# Patient Record
Sex: FEMALE | Race: WHITE | NOT HISPANIC OR LATINO | Employment: OTHER | URBAN - METROPOLITAN AREA
[De-identification: names, ages, dates, MRNs, and addresses within clinical notes are randomized per-mention and may not be internally consistent; named-entity substitution may affect disease eponyms.]

---

## 2021-06-01 ENCOUNTER — RECORDS - HEALTHEAST (OUTPATIENT)
Dept: ADMINISTRATIVE | Facility: CLINIC | Age: 81
End: 2021-06-01

## 2023-09-18 ENCOUNTER — HOSPITAL ENCOUNTER (EMERGENCY)
Facility: CLINIC | Age: 83
Discharge: HOME | End: 2023-09-19
Attending: EMERGENCY MEDICINE | Admitting: EMERGENCY MEDICINE
Payer: MEDICARE

## 2023-09-18 DIAGNOSIS — U07.1 INFECTION DUE TO 2019 NOVEL CORONAVIRUS: ICD-10-CM

## 2023-09-18 DIAGNOSIS — U07.1 COVID-19 VIRUS INFECTION: ICD-10-CM

## 2023-09-18 DIAGNOSIS — R29.6 FALLS FREQUENTLY: ICD-10-CM

## 2023-09-18 PROCEDURE — 80048 BASIC METABOLIC PNL TOTAL CA: CPT | Performed by: EMERGENCY MEDICINE

## 2023-09-18 PROCEDURE — 36415 COLL VENOUS BLD VENIPUNCTURE: CPT | Performed by: EMERGENCY MEDICINE

## 2023-09-18 PROCEDURE — 99284 EMERGENCY DEPT VISIT MOD MDM: CPT | Performed by: EMERGENCY MEDICINE

## 2023-09-18 PROCEDURE — 85004 AUTOMATED DIFF WBC COUNT: CPT | Performed by: EMERGENCY MEDICINE

## 2023-09-18 PROCEDURE — 99284 EMERGENCY DEPT VISIT MOD MDM: CPT | Mod: 25 | Performed by: EMERGENCY MEDICINE

## 2023-09-19 ENCOUNTER — APPOINTMENT (OUTPATIENT)
Dept: CT IMAGING | Facility: CLINIC | Age: 83
End: 2023-09-19
Attending: EMERGENCY MEDICINE
Payer: MEDICARE

## 2023-09-19 VITALS
WEIGHT: 106 LBS | HEIGHT: 62 IN | BODY MASS INDEX: 19.51 KG/M2 | HEART RATE: 107 BPM | SYSTOLIC BLOOD PRESSURE: 118 MMHG | TEMPERATURE: 99.4 F | DIASTOLIC BLOOD PRESSURE: 72 MMHG | OXYGEN SATURATION: 98 %

## 2023-09-19 PROBLEM — G62.0 DRUG-INDUCED POLYNEUROPATHY (H): Status: ACTIVE | Noted: 2017-12-12

## 2023-09-19 PROBLEM — I49.3 PVC'S (PREMATURE VENTRICULAR CONTRACTIONS): Status: ACTIVE | Noted: 2017-11-20

## 2023-09-19 PROBLEM — D70.2 DRUG-INDUCED NEUTROPENIA (H): Status: ACTIVE | Noted: 2017-01-10

## 2023-09-19 PROBLEM — R29.6 REPEATED FALLS: Status: ACTIVE | Noted: 2023-06-21

## 2023-09-19 LAB
ANION GAP SERPL CALCULATED.3IONS-SCNC: 11 MMOL/L (ref 7–15)
BASOPHILS # BLD AUTO: 0 10E3/UL (ref 0–0.2)
BASOPHILS NFR BLD AUTO: 0 %
BUN SERPL-MCNC: 12.8 MG/DL (ref 8–23)
CALCIUM SERPL-MCNC: 9.4 MG/DL (ref 8.8–10.2)
CHLORIDE SERPL-SCNC: 100 MMOL/L (ref 98–107)
CREAT SERPL-MCNC: 0.78 MG/DL (ref 0.51–0.95)
DEPRECATED HCO3 PLAS-SCNC: 27 MMOL/L (ref 22–29)
EGFRCR SERPLBLD CKD-EPI 2021: 74 ML/MIN/1.73M2
EOSINOPHIL # BLD AUTO: 0 10E3/UL (ref 0–0.7)
EOSINOPHIL NFR BLD AUTO: 1 %
ERYTHROCYTE [DISTWIDTH] IN BLOOD BY AUTOMATED COUNT: 14.7 % (ref 10–15)
GLUCOSE SERPL-MCNC: 121 MG/DL (ref 70–99)
HCT VFR BLD AUTO: 36.1 % (ref 35–47)
HGB BLD-MCNC: 11.8 G/DL (ref 11.7–15.7)
HOLD SPECIMEN: NORMAL
HOLD SPECIMEN: NORMAL
IMM GRANULOCYTES # BLD: 0.1 10E3/UL
IMM GRANULOCYTES NFR BLD: 1 %
LYMPHOCYTES # BLD AUTO: 0.6 10E3/UL (ref 0.8–5.3)
LYMPHOCYTES NFR BLD AUTO: 8 %
MCH RBC QN AUTO: 31 PG (ref 26.5–33)
MCHC RBC AUTO-ENTMCNC: 32.7 G/DL (ref 31.5–36.5)
MCV RBC AUTO: 95 FL (ref 78–100)
MONOCYTES # BLD AUTO: 0.9 10E3/UL (ref 0–1.3)
MONOCYTES NFR BLD AUTO: 13 %
NEUTROPHILS # BLD AUTO: 5.8 10E3/UL (ref 1.6–8.3)
NEUTROPHILS NFR BLD AUTO: 77 %
NRBC # BLD AUTO: 0 10E3/UL
NRBC BLD AUTO-RTO: 0 /100
PLATELET # BLD AUTO: 162 10E3/UL (ref 150–450)
POTASSIUM SERPL-SCNC: 3.4 MMOL/L (ref 3.4–5.3)
RBC # BLD AUTO: 3.81 10E6/UL (ref 3.8–5.2)
SODIUM SERPL-SCNC: 138 MMOL/L (ref 136–145)
WBC # BLD AUTO: 7.4 10E3/UL (ref 4–11)

## 2023-09-19 PROCEDURE — 70450 CT HEAD/BRAIN W/O DYE: CPT | Mod: MA

## 2023-09-19 ASSESSMENT — ACTIVITIES OF DAILY LIVING (ADL): ADLS_ACUITY_SCORE: 35

## 2023-09-19 NOTE — ED PROVIDER NOTES
History     Chief Complaint   Patient presents with    Fall    Suspected Covid     HPI  Jacquelin Sampson is a 83 year old female with history notable for multiple myeloma, drug-induced polyneuropathy, mixed hyperlipidemia, PVCs, repeated falls, who presents with 3 days nasal congestion, rhinorrhea, cough, with positive home covid test today. Fell three times today. Eating and drinking okay. No headache or vomiting. Has chronic back  pian. No neck pain. No abdominal pain no chest pain. No urinary symptoms. Needed help getting up on last two falls.  had to call family to help.     Patient denies any complaints, is here at urging of family.     The patient's PMHx, Surgical Hx, Allergies, and Medications were all reviewed with the patient.    Revlimid, 81 asa, simvastatin, dexamethasone, metoprolol    Allergies:  No Known Allergies    Problem List:    Patient Active Problem List    Diagnosis Date Noted    Repeated falls 06/21/2023     Priority: Medium    Drug-induced polyneuropathy (H) 12/12/2017     Priority: Medium    PVC's (premature ventricular contractions) 11/20/2017     Priority: Medium     Formatting of this note might be different from the original. Maintained on beta blocker therapy with Metoprolol XL, and this has suppressed the PVC's quite well.      Drug-induced neutropenia (H) 01/10/2017     Priority: Medium    Multiple myeloma (H) 05/13/2013     Priority: Medium     Formatting of this note is different from the original. CT 5/2013 suspicious for multiple myeloma. Bone marrow biopsy positive 6/2013 Has chronic low back, has been on Tylenol #3 chronically but only uses at bedtime/night as needed for severe pain.On Cyclone clinical trial. Had one year of chemotherapy on a trial drug at [facility] starting 6/2013, and had a good response to this drug, and follows every 3 months at [place] and has been in remission since completing this course of treatment. Mild progression of metastatic myeloma (skeletal  mets again seen on bone survey done 8/22/2016) noted at Hematology follow up with Dr. Tsai at [facility] 8/22/2016, and decision made to resume treatment again (with the exact agent to be determined based on insurance coverage issues). On Lenalidomide subsequently. At follow up with Dr. Tsai 3/21/2019: ASSESSMENT / PLAN #1 Polyneuropathy Due To Drug (HCC) #2 Pain Back Thoracic #3 Multiple Myeloma Not Having Achieved Remission (HCC) She is doing well. She likely continues to be in remission. Her SPEP and free light chain studies are pending and will not be back until tomorrow. I will call her with the results. Her total protein today is lower than from 3 months ago and therefore it is unlikely that there is progressive disease. My plan is to continue lenalidomide at 10 mg p.o. days 1 through 21 every month and dexamethasone 20 mg PO weekly. I gave her a for refill for hydromorphone prescription which she takes for her chronic low back pain. I plan to see her back for follow-up in 3 months.      Mixed hyperlipidemia 03/08/2009     Priority: Medium    Displacement of cervical intervertebral disc without myelopathy 03/07/2007     Priority: Medium     Formatting of this note might be different from the original. s/p laminectomy C7          Past Medical History:    No past medical history on file.    Past Surgical History:    No past surgical history on file.    Family History:    No family history on file.    Social History:  Marital Status:   [2]        Medications:    nirmatrelvir and ritonavir (PAXLOVID) 300 mg/100 mg therapy pack  aspirin 325 MG tablet  BACLOFEN PO  Cholecalciferol (VITAMIN D3 PO)  ferrous sulfate 325 (65 FE) MG tablet  HYDROmorphone (DILAUDID) 2 MG tablet  HYDROmorphone HCl (DILAUDID PO)  Nadolol (CORGARD PO)  SIMVASTATIN PO          Review of Systems  Pertinent positives and negatives mentioned in HPI    Physical Exam   BP: 137/79  Pulse: 82  Temp: 99.4  F (37.4  C)  Height: 157.5 cm (5'  "2\")  Weight: 48.1 kg (106 lb)  SpO2: 99 %    Physical Exam  GEN: Awake, alert, and cooperative.  Appears mildly distressed but nontoxic  HENT: No hemotympanum or otorrhea.  No retroauricular ecchymosis.  No signs of scalp abrasion or laceration.  No signs of facial tenderness or epistaxis.    EYES: EOM intact. Conjunctiva clear. No discharge.  No RAPD no nystagmus  NECK: Symmetric, freely mobile.  No posterior tenderness  CV : Regular rate and rhythm.  PULM: Normal effort. No wheezes, rales, or rhonchi bilaterally.  ABD: Soft, non-tender, non-distended. No rebound or guarding.   NEURO: Normal speech. Following commands. CN II-XII grossly intact. Answering questions and interacting appropriately.   EXT: Tenderness to palpation of ankles knees and hips, wrists, elbows or shoulders.  INT: Warm. No diaphoresis. Normal color.        ED Course        Procedures                 Critical Care time:  none               Results for orders placed or performed during the hospital encounter of 09/18/23 (from the past 24 hour(s))   [facility] Draw    Narrative    The following orders were created for panel order [facility] Draw.  Procedure                               Abnormality         Status                     ---------                               -----------         ------                     Extra Green Top (Lithium...[[id number]]                      Final result               Extra Purple Top Tube[[id number]]                            Final result                 Please view results for these tests on the individual orders.   Extra Green Top (Lithium Heparin) Tube   Result Value Ref Range    Hold Specimen JIC    Extra Purple Top Tube   Result Value Ref Range    Hold Specimen JIC    CBC with platelets, differential    Narrative    The following orders were created for panel order CBC with platelets, differential.  Procedure                               Abnormality         Status                     ---------                   "             -----------         ------                     CBC with platelets and d...[[id number]]  Abnormal            Final result                 Please view results for these tests on the individual orders.   Basic metabolic panel   Result Value Ref Range    Sodium 138 136 - 145 mmol/L    Potassium 3.4 3.4 - 5.3 mmol/L    Chloride 100 98 - 107 mmol/L    Carbon Dioxide (CO2) 27 22 - 29 mmol/L    Anion Gap 11 7 - 15 mmol/L    Urea Nitrogen 12.8 8.0 - 23.0 mg/dL    Creatinine 0.78 0.51 - 0.95 mg/dL    Calcium 9.4 8.8 - 10.2 mg/dL    Glucose 121 (H) 70 - 99 mg/dL    GFR Estimate 74 >60 mL/min/1.73m2   CBC with platelets and differential   Result Value Ref Range    WBC Count 7.4 4.0 - 11.0 10e3/uL    RBC Count 3.81 3.80 - 5.20 10e6/uL    Hemoglobin 11.8 11.7 - 15.7 g/dL    Hematocrit 36.1 35.0 - 47.0 %    MCV 95 78 - 100 fL    MCH 31.0 26.5 - 33.0 pg    MCHC 32.7 31.5 - 36.5 g/dL    RDW 14.7 10.0 - 15.0 %    Platelet Count 162 150 - 450 10e3/uL    % Neutrophils 77 %    % Lymphocytes 8 %    % Monocytes 13 %    % Eosinophils 1 %    % Basophils 0 %    % Immature Granulocytes 1 %    NRBCs per 100 WBC 0 <1 /100    Absolute Neutrophils 5.8 1.6 - 8.3 10e3/uL    Absolute Lymphocytes 0.6 (L) 0.8 - 5.3 10e3/uL    Absolute Monocytes 0.9 0.0 - 1.3 10e3/uL    Absolute Eosinophils 0.0 0.0 - 0.7 10e3/uL    Absolute Basophils 0.0 0.0 - 0.2 10e3/uL    Absolute Immature Granulocytes 0.1 <=0.4 10e3/uL    Absolute NRBCs 0.0 10e3/uL   Head CT w/o contrast    Narrative    EXAM: CT HEAD W/O CONTRAST  LOCATION: [facility]  DATE: 9/19/2023    INDICATION: frequent falls  COMPARISON: None.  TECHNIQUE: Routine CT Head without IV contrast. Multiplanar reformats. Dose reduction techniques were used.    FINDINGS:  INTRACRANIAL CONTENTS: No intracranial hemorrhage, extraaxial collection, or mass effect.  No CT evidence of acute infarct. Moderate burden presumed chronic small vessel ischemia. Normal ventricles and sulci.      VISUALIZED ORBITS/SINUSES/MASTOIDS: No intraorbital abnormality. No paranasal sinus mucosal disease. No middle ear or mastoid effusion.    BONES/SOFT TISSUES: No acute abnormality.      Impression    IMPRESSION:  1.  No acute intracranial abnormality.    2.  Moderate burden presumed chronic small vessel ischemia.       Medications - No data to display    Assessments & Plan (with Medical Decision Making)   83 year old female with past medical history 83-year-old female with history of multiple myeloma, frequent falls, with 3 days of cold symptoms and positive home COVID testing today.  Denies any new pain.  She has chronic back pain related to her multiple myeloma.  No midline tenderness to palpation.  No cervical tenderness to palpation or discomfort with passive rotation or flexion. Basic labs grossly normal. Non contrast CT head w/out acute abnormality.  Patient is very adamant that she is not staying in the hospital and she wants to go home.  Her  is very concerned because of last time she felt he was unable to pick her up and had to call for help from family.  He really wants her to stay in the hospital for a day or 2.  Unfortunate there are no beds here available at [facility] but she is welcome to board in our department.  After numerous conversations between myself and the patient as well as the family member she is adamant that she is going home.  The patient is going home has 3 days of symptoms has multiple risk factors and positive home COVID test we will start treatment with Paxlovid.  She will need to stop her atorvastatin while she is taking it and do not restart until 5 days after completing course (10 days) do not see any interactions between Revlimid and Paxlovid.  Patient tells her  that if she falls again then she will come back.  Hopefully she does well but I am glad to hear that if this situation stays the same or worsens they will come back.  She would likely benefit from  possible PT/OT after she is through her current illness.    I have reviewed the nursing notes.         Discharge Medication List as of 9/19/2023  1:54 AM        START taking these medications    Details   nirmatrelvir and ritonavir (PAXLOVID) 300 mg/100 mg therapy pack Take 3 tablets by mouth 2 times daily for 5 days Take 2 Nirmatrelvir tablets and 1 Ritonavir tablet twice daily for 5 days., Disp-30 tablet, R-0, E-PrescribeIf Paxlovid unavailable and no Molnupiravir ordered, refer patient to MNRAP at [email or web address]. If Molnupiravir ordered along with Paxlovid, dispense Molnupiravir and review embryotoxicity.             Final diagnoses:   Falls frequently   COVID-19 virus infection   Infection due to 2019 novel coronavirus     Harinder Louie MD    9/18/2023   [facility] EMERGENCY DEPT    Disclaimer: This note consists of words and symbols derived from keyboarding and dictation using voice recognition software.  As a result, there may be errors that have gone undetected.  Please consider this when interpreting information found in this note.               Harinder Louie MD  09/19/23 [id number]

## 2023-09-19 NOTE — DISCHARGE INSTRUCTIONS
Do not take atorvastatin for next 10 days.     Please return to ED if you develop any new or worsening symptoms.

## 2023-09-19 NOTE — ED TRIAGE NOTES
Pt c/o falling several times today.  C/o weakness and covid + today.  Neck pain from the falls.  Denies hitting head.  No LOC.   Triage Assessment       Row Name 09/18/23 2005       Triage Assessment (Adult)    Airway WDL WDL       Respiratory WDL    Respiratory WDL WDL       Skin Circulation/Temperature WDL    Skin Circulation/Temperature WDL WDL       Cardiac WDL    Cardiac WDL WDL       Peripheral/Neurovascular WDL    Peripheral Neurovascular WDL WDL       Cognitive/Neuro/Behavioral WDL    Cognitive/Neuro/Behavioral WDL WDL

## 2023-09-20 ENCOUNTER — PATIENT OUTREACH (OUTPATIENT)
Dept: CARE COORDINATION | Facility: CLINIC | Age: 83
End: 2023-09-20
Payer: MEDICARE

## 2023-09-20 NOTE — PROGRESS NOTES
Clinic Care Coordination Contact    Referral Type: Virtual Home Monitoring - Epic Care Companion    Patient referred for Virtual Home Monitoring Program for either COVID-19 or Community Acquired Pneumonia diagnosis following recent [facility] ED visit.  Epic Care Companion referral was also placed by provider.    Criteria for Virtual Home Monitoring telephone outreach is not met after review of ED encounter/ED provider note because:    1) ED provider note indicates assessment was negative for respiratory distress. O2 sats were stable throughout course of ED visit per chart review.      2) Patient was not discharged with new supplemental oxygen.    Per notes, ED provider and/or ED care team discussed appropriate follow up guidelines with patient prior to discharge or reflected these instructions on AVS.       Care Companion team remains available to support patient via AddSearch account once activated by patient.     Jennifer Goldman RN  [facility]  - RN Care Coordinator

## 2024-01-01 ENCOUNTER — DOCUMENTATION ONLY (OUTPATIENT)
Dept: GERIATRICS | Facility: CLINIC | Age: 84
End: 2024-01-01
Payer: MEDICARE

## 2024-01-01 ENCOUNTER — TRANSITIONAL CARE UNIT VISIT (OUTPATIENT)
Dept: GERIATRICS | Facility: CLINIC | Age: 84
End: 2024-01-01
Payer: MEDICARE

## 2024-01-01 ENCOUNTER — APPOINTMENT (OUTPATIENT)
Dept: MRI IMAGING | Facility: HOSPITAL | Age: 84
DRG: 551 | End: 2024-01-01
Attending: EMERGENCY MEDICINE
Payer: MEDICARE

## 2024-01-01 ENCOUNTER — TELEPHONE (OUTPATIENT)
Dept: GERIATRICS | Facility: CLINIC | Age: 84
End: 2024-01-01
Payer: OTHER MISCELLANEOUS

## 2024-01-01 ENCOUNTER — NURSING HOME VISIT (OUTPATIENT)
Dept: GERIATRICS | Facility: CLINIC | Age: 84
End: 2024-01-01
Payer: OTHER MISCELLANEOUS

## 2024-01-01 ENCOUNTER — LAB REQUISITION (OUTPATIENT)
Dept: LAB | Facility: CLINIC | Age: 84
End: 2024-01-01
Payer: MEDICARE

## 2024-01-01 ENCOUNTER — LAB REQUISITION (OUTPATIENT)
Dept: LAB | Facility: CLINIC | Age: 84
End: 2024-01-01
Payer: OTHER MISCELLANEOUS

## 2024-01-01 ENCOUNTER — APPOINTMENT (OUTPATIENT)
Dept: RADIOLOGY | Facility: HOSPITAL | Age: 84
DRG: 551 | End: 2024-01-01
Attending: PHYSICIAN ASSISTANT
Payer: MEDICARE

## 2024-01-01 ENCOUNTER — DOCUMENTATION ONLY (OUTPATIENT)
Dept: GERIATRICS | Facility: CLINIC | Age: 84
End: 2024-01-01
Payer: OTHER MISCELLANEOUS

## 2024-01-01 ENCOUNTER — APPOINTMENT (OUTPATIENT)
Dept: RADIOLOGY | Facility: HOSPITAL | Age: 84
DRG: 551 | End: 2024-01-01
Attending: STUDENT IN AN ORGANIZED HEALTH CARE EDUCATION/TRAINING PROGRAM
Payer: MEDICARE

## 2024-01-01 ENCOUNTER — HOSPITAL ENCOUNTER (INPATIENT)
Facility: HOSPITAL | Age: 84
LOS: 3 days | Discharge: NURSING, REHAB OR LONG-TERM CARE | DRG: 551 | End: 2024-08-29
Attending: EMERGENCY MEDICINE | Admitting: INTERNAL MEDICINE
Payer: MEDICARE

## 2024-01-01 ENCOUNTER — APPOINTMENT (OUTPATIENT)
Dept: OCCUPATIONAL THERAPY | Facility: HOSPITAL | Age: 84
DRG: 551 | End: 2024-01-01
Attending: INTERNAL MEDICINE
Payer: MEDICARE

## 2024-01-01 ENCOUNTER — LAB REQUISITION (OUTPATIENT)
Dept: LAB | Facility: CLINIC | Age: 84
End: 2024-01-01

## 2024-01-01 ENCOUNTER — PATIENT OUTREACH (OUTPATIENT)
Dept: CARE COORDINATION | Facility: CLINIC | Age: 84
End: 2024-01-01
Payer: MEDICARE

## 2024-01-01 ENCOUNTER — APPOINTMENT (OUTPATIENT)
Dept: CT IMAGING | Facility: HOSPITAL | Age: 84
DRG: 551 | End: 2024-01-01
Attending: EMERGENCY MEDICINE
Payer: MEDICARE

## 2024-01-01 ENCOUNTER — APPOINTMENT (OUTPATIENT)
Dept: PHYSICAL THERAPY | Facility: HOSPITAL | Age: 84
DRG: 551 | End: 2024-01-01
Attending: INTERNAL MEDICINE
Payer: MEDICARE

## 2024-01-01 VITALS
TEMPERATURE: 98.7 F | HEART RATE: 82 BPM | SYSTOLIC BLOOD PRESSURE: 91 MMHG | RESPIRATION RATE: 16 BRPM | OXYGEN SATURATION: 99 % | WEIGHT: 99.8 LBS | BODY MASS INDEX: 18.25 KG/M2 | DIASTOLIC BLOOD PRESSURE: 47 MMHG

## 2024-01-01 VITALS
OXYGEN SATURATION: 97 % | HEIGHT: 62 IN | BODY MASS INDEX: 19.3 KG/M2 | DIASTOLIC BLOOD PRESSURE: 54 MMHG | SYSTOLIC BLOOD PRESSURE: 123 MMHG | TEMPERATURE: 97.9 F | WEIGHT: 104.9 LBS | RESPIRATION RATE: 17 BRPM | HEART RATE: 76 BPM

## 2024-01-01 VITALS
BODY MASS INDEX: 20.8 KG/M2 | HEART RATE: 84 BPM | DIASTOLIC BLOOD PRESSURE: 55 MMHG | RESPIRATION RATE: 16 BRPM | HEIGHT: 62 IN | SYSTOLIC BLOOD PRESSURE: 94 MMHG | TEMPERATURE: 98.5 F | OXYGEN SATURATION: 98 % | WEIGHT: 113 LBS

## 2024-01-01 VITALS — BODY MASS INDEX: 20.78 KG/M2 | WEIGHT: 113.6 LBS

## 2024-01-01 VITALS
TEMPERATURE: 98 F | WEIGHT: 103.2 LBS | DIASTOLIC BLOOD PRESSURE: 65 MMHG | SYSTOLIC BLOOD PRESSURE: 118 MMHG | RESPIRATION RATE: 16 BRPM | OXYGEN SATURATION: 99 % | HEIGHT: 62 IN | HEART RATE: 58 BPM | BODY MASS INDEX: 18.99 KG/M2

## 2024-01-01 VITALS
BODY MASS INDEX: 20.78 KG/M2 | TEMPERATURE: 98.3 F | SYSTOLIC BLOOD PRESSURE: 136 MMHG | WEIGHT: 113.6 LBS | HEART RATE: 69 BPM | DIASTOLIC BLOOD PRESSURE: 73 MMHG | OXYGEN SATURATION: 97 % | RESPIRATION RATE: 18 BRPM

## 2024-01-01 VITALS
HEIGHT: 62 IN | WEIGHT: 99.8 LBS | SYSTOLIC BLOOD PRESSURE: 103 MMHG | OXYGEN SATURATION: 98 % | RESPIRATION RATE: 16 BRPM | TEMPERATURE: 98.7 F | DIASTOLIC BLOOD PRESSURE: 60 MMHG | BODY MASS INDEX: 18.37 KG/M2 | HEART RATE: 76 BPM

## 2024-01-01 VITALS
SYSTOLIC BLOOD PRESSURE: 117 MMHG | OXYGEN SATURATION: 97 % | HEART RATE: 65 BPM | WEIGHT: 108.6 LBS | DIASTOLIC BLOOD PRESSURE: 62 MMHG | BODY MASS INDEX: 19.86 KG/M2 | RESPIRATION RATE: 18 BRPM | TEMPERATURE: 97 F

## 2024-01-01 VITALS
HEIGHT: 62 IN | TEMPERATURE: 97.2 F | OXYGEN SATURATION: 99 % | HEART RATE: 63 BPM | WEIGHT: 103.2 LBS | RESPIRATION RATE: 16 BRPM | DIASTOLIC BLOOD PRESSURE: 64 MMHG | BODY MASS INDEX: 18.99 KG/M2 | SYSTOLIC BLOOD PRESSURE: 117 MMHG

## 2024-01-01 DIAGNOSIS — M54.50 ACUTE MIDLINE LOW BACK PAIN WITHOUT SCIATICA: Primary | ICD-10-CM

## 2024-01-01 DIAGNOSIS — S32.020D WEDGE COMPRESSION FRACTURE OF SECOND LUMBAR VERTEBRA, SUBSEQUENT ENCOUNTER FOR FRACTURE WITH ROUTINE HEALING: ICD-10-CM

## 2024-01-01 DIAGNOSIS — M54.50 ACUTE MIDLINE LOW BACK PAIN WITHOUT SCIATICA: ICD-10-CM

## 2024-01-01 DIAGNOSIS — R41.0 DISORIENTATION, UNSPECIFIED: ICD-10-CM

## 2024-01-01 DIAGNOSIS — R53.1 WEAKNESS: ICD-10-CM

## 2024-01-01 DIAGNOSIS — S32.020S CLOSED COMPRESSION FRACTURE OF L2 LUMBAR VERTEBRA, SEQUELA: Primary | ICD-10-CM

## 2024-01-01 DIAGNOSIS — M51.370 DEGENERATION OF INTERVERTEBRAL DISC OF LUMBOSACRAL REGION WITH DISCOGENIC BACK PAIN: ICD-10-CM

## 2024-01-01 DIAGNOSIS — M47.896 OTHER OSTEOARTHRITIS OF SPINE, LUMBAR REGION: ICD-10-CM

## 2024-01-01 DIAGNOSIS — E43 SEVERE PROTEIN-CALORIE MALNUTRITION (H): ICD-10-CM

## 2024-01-01 DIAGNOSIS — K59.03 DRUG-INDUCED CONSTIPATION: ICD-10-CM

## 2024-01-01 DIAGNOSIS — Z51.5 HOSPICE CARE PATIENT: ICD-10-CM

## 2024-01-01 DIAGNOSIS — S32.020A CLOSED COMPRESSION FRACTURE OF L2 LUMBAR VERTEBRA, INITIAL ENCOUNTER (H): ICD-10-CM

## 2024-01-01 DIAGNOSIS — G62.0 DRUG-INDUCED POLYNEUROPATHY (H): ICD-10-CM

## 2024-01-01 DIAGNOSIS — I48.91 NEW ONSET ATRIAL FIBRILLATION (H): ICD-10-CM

## 2024-01-01 DIAGNOSIS — R29.6 REPEATED FALLS: ICD-10-CM

## 2024-01-01 DIAGNOSIS — S01.01XA SCALP LACERATION, INITIAL ENCOUNTER: ICD-10-CM

## 2024-01-01 DIAGNOSIS — I10 ESSENTIAL HYPERTENSION: ICD-10-CM

## 2024-01-01 DIAGNOSIS — Z74.09 IMPAIRED MOBILITY AND ADLS: ICD-10-CM

## 2024-01-01 DIAGNOSIS — C90.00 MULTIPLE MYELOMA NOT HAVING ACHIEVED REMISSION (H): ICD-10-CM

## 2024-01-01 DIAGNOSIS — R41.89 COGNITIVE IMPAIRMENT: ICD-10-CM

## 2024-01-01 DIAGNOSIS — S01.01XD LACERATION OF SCALP, SUBSEQUENT ENCOUNTER: ICD-10-CM

## 2024-01-01 DIAGNOSIS — M51.379 DEGENERATION OF LUMBAR OR LUMBOSACRAL INTERVERTEBRAL DISC: ICD-10-CM
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
## 2024-01-01 DIAGNOSIS — E87.6 HYPOKALEMIA: ICD-10-CM

## 2024-01-01 DIAGNOSIS — E78.2 MIXED HYPERLIPIDEMIA: ICD-10-CM

## 2024-01-01 DIAGNOSIS — M81.0 AGE-RELATED OSTEOPOROSIS WITHOUT CURRENT PATHOLOGICAL FRACTURE: ICD-10-CM

## 2024-01-01 DIAGNOSIS — Q39.4 ESOPHAGEAL WEB DETERMINED BY ENDOSCOPY: ICD-10-CM

## 2024-01-01 DIAGNOSIS — D64.9 ANEMIA, UNSPECIFIED: ICD-10-CM

## 2024-01-01 DIAGNOSIS — M47.896 OTHER OSTEOARTHRITIS OF SPINE, LUMBAR REGION: Primary | ICD-10-CM

## 2024-01-01 DIAGNOSIS — Z78.9 IMPAIRED MOBILITY AND ADLS: ICD-10-CM

## 2024-01-01 DIAGNOSIS — G62.9 POLYNEUROPATHY, UNSPECIFIED: ICD-10-CM

## 2024-01-01 DIAGNOSIS — S32.020S CLOSED COMPRESSION FRACTURE OF L2 LUMBAR VERTEBRA, SEQUELA: ICD-10-CM

## 2024-01-01 DIAGNOSIS — E86.1 HYPOTENSION DUE TO HYPOVOLEMIA: ICD-10-CM

## 2024-01-01 DIAGNOSIS — S01.01XD LACERATION WITHOUT FOREIGN BODY OF SCALP, SUBSEQUENT ENCOUNTER: ICD-10-CM

## 2024-01-01 DIAGNOSIS — N39.0 URINARY TRACT INFECTION, SITE NOT SPECIFIED: ICD-10-CM

## 2024-01-01 DIAGNOSIS — F41.9 ANXIETY: Primary | ICD-10-CM

## 2024-01-01 DIAGNOSIS — N30.00 ACUTE CYSTITIS WITHOUT HEMATURIA: Primary | ICD-10-CM

## 2024-01-01 DIAGNOSIS — I10 ESSENTIAL (PRIMARY) HYPERTENSION: ICD-10-CM

## 2024-01-01 DIAGNOSIS — Z87.81 HX OF COMPRESSION FRACTURE OF SPINE: ICD-10-CM

## 2024-01-01 LAB
ALBUMIN UR-MCNC: 10 MG/DL
ALBUMIN UR-MCNC: NEGATIVE MG/DL
ALBUMIN UR-MCNC: NEGATIVE MG/DL
ANION GAP SERPL CALCULATED.3IONS-SCNC: 11 MMOL/L (ref 7–15)
ANION GAP SERPL CALCULATED.3IONS-SCNC: 13 MMOL/L (ref 7–15)
ANION GAP SERPL CALCULATED.3IONS-SCNC: 14 MMOL/L (ref 7–15)
ANION GAP SERPL CALCULATED.3IONS-SCNC: 17 MMOL/L (ref 7–15)
ANION GAP SERPL CALCULATED.3IONS-SCNC: 7 MMOL/L (ref 7–15)
ANION GAP SERPL CALCULATED.3IONS-SCNC: 9 MMOL/L (ref 7–15)
ANION GAP SERPL CALCULATED.3IONS-SCNC: 9 MMOL/L (ref 7–15)
APPEARANCE UR: ABNORMAL
APPEARANCE UR: CLEAR
APPEARANCE UR: CLEAR
ATRIAL RATE - MUSE: 234 BPM
ATRIAL RATE - MUSE: 66 BPM
BACTERIA UR CULT: ABNORMAL
BACTERIA UR CULT: ABNORMAL
BACTERIA UR CULT: NORMAL
BASOPHILS # BLD AUTO: 0 10E3/UL (ref 0–0.2)
BASOPHILS NFR BLD AUTO: 0 %
BILIRUB UR QL STRIP: NEGATIVE
BUN SERPL-MCNC: 14.9 MG/DL (ref 8–23)
BUN SERPL-MCNC: 18.5 MG/DL (ref 8–23)
BUN SERPL-MCNC: 19.7 MG/DL (ref 8–23)
BUN SERPL-MCNC: 19.7 MG/DL (ref 8–23)
BUN SERPL-MCNC: 20.7 MG/DL (ref 8–23)
BUN SERPL-MCNC: 22.4 MG/DL (ref 8–23)
BUN SERPL-MCNC: 28.9 MG/DL (ref 8–23)
CA-I BLD-MCNC: 4.2 MG/DL (ref 4.4–5.2)
CA-I BLD-MCNC: 4.3 MG/DL (ref 4.4–5.2)
CALCIUM SERPL-MCNC: 7.6 MG/DL (ref 8.8–10.4)
CALCIUM SERPL-MCNC: 7.9 MG/DL (ref 8.8–10.4)
CALCIUM SERPL-MCNC: 7.9 MG/DL (ref 8.8–10.4)
CALCIUM SERPL-MCNC: 8.6 MG/DL (ref 8.8–10.4)
CALCIUM SERPL-MCNC: 8.7 MG/DL (ref 8.8–10.4)
CALCIUM SERPL-MCNC: 8.9 MG/DL (ref 8.8–10.4)
CALCIUM SERPL-MCNC: 9 MG/DL (ref 8.8–10.4)
CAOX CRY #/AREA URNS HPF: ABNORMAL /HPF
CHLORIDE SERPL-SCNC: 100 MMOL/L (ref 98–107)
CHLORIDE SERPL-SCNC: 100 MMOL/L (ref 98–107)
CHLORIDE SERPL-SCNC: 101 MMOL/L (ref 98–107)
CHLORIDE SERPL-SCNC: 103 MMOL/L (ref 98–107)
CHLORIDE SERPL-SCNC: 104 MMOL/L (ref 98–107)
CHLORIDE SERPL-SCNC: 108 MMOL/L (ref 98–107)
CHLORIDE SERPL-SCNC: 108 MMOL/L (ref 98–107)
COLOR UR AUTO: ABNORMAL
CREAT SERPL-MCNC: 0.79 MG/DL (ref 0.51–0.95)
CREAT SERPL-MCNC: 0.8 MG/DL (ref 0.51–0.95)
CREAT SERPL-MCNC: 0.84 MG/DL (ref 0.51–0.95)
CREAT SERPL-MCNC: 0.84 MG/DL (ref 0.51–0.95)
CREAT SERPL-MCNC: 0.87 MG/DL (ref 0.51–0.95)
CREAT SERPL-MCNC: 0.89 MG/DL (ref 0.51–0.95)
CREAT SERPL-MCNC: 1.03 MG/DL (ref 0.51–0.95)
DIASTOLIC BLOOD PRESSURE - MUSE: 56 MMHG
DIASTOLIC BLOOD PRESSURE - MUSE: NORMAL MMHG
EGFRCR SERPLBLD CKD-EPI 2021: 53 ML/MIN/1.73M2
EGFRCR SERPLBLD CKD-EPI 2021: 63 ML/MIN/1.73M2
EGFRCR SERPLBLD CKD-EPI 2021: 65 ML/MIN/1.73M2
EGFRCR SERPLBLD CKD-EPI 2021: 68 ML/MIN/1.73M2
EGFRCR SERPLBLD CKD-EPI 2021: 68 ML/MIN/1.73M2
EGFRCR SERPLBLD CKD-EPI 2021: 72 ML/MIN/1.73M2
EGFRCR SERPLBLD CKD-EPI 2021: 73 ML/MIN/1.73M2
EOSINOPHIL # BLD AUTO: 0 10E3/UL (ref 0–0.7)
EOSINOPHIL NFR BLD AUTO: 0 %
ERYTHROCYTE [DISTWIDTH] IN BLOOD BY AUTOMATED COUNT: 15.2 % (ref 10–15)
ERYTHROCYTE [DISTWIDTH] IN BLOOD BY AUTOMATED COUNT: 15.5 % (ref 10–15)
ERYTHROCYTE [DISTWIDTH] IN BLOOD BY AUTOMATED COUNT: 16.1 % (ref 10–15)
GLUCOSE BLDC GLUCOMTR-MCNC: 79 MG/DL (ref 70–99)
GLUCOSE SERPL-MCNC: 113 MG/DL (ref 70–99)
GLUCOSE SERPL-MCNC: 137 MG/DL (ref 70–99)
GLUCOSE SERPL-MCNC: 82 MG/DL (ref 70–99)
GLUCOSE SERPL-MCNC: 84 MG/DL (ref 70–99)
GLUCOSE SERPL-MCNC: 85 MG/DL (ref 70–99)
GLUCOSE SERPL-MCNC: 88 MG/DL (ref 70–99)
GLUCOSE SERPL-MCNC: 89 MG/DL (ref 70–99)
GLUCOSE UR STRIP-MCNC: NEGATIVE MG/DL
HCO3 SERPL-SCNC: 25 MMOL/L (ref 22–29)
HCO3 SERPL-SCNC: 26 MMOL/L (ref 22–29)
HCO3 SERPL-SCNC: 26 MMOL/L (ref 22–29)
HCO3 SERPL-SCNC: 27 MMOL/L (ref 22–29)
HCT VFR BLD AUTO: 26.8 % (ref 35–47)
HCT VFR BLD AUTO: 27.1 % (ref 35–47)
HCT VFR BLD AUTO: 28.8 % (ref 35–47)
HCT VFR BLD AUTO: 31.8 % (ref 35–47)
HCT VFR BLD AUTO: 31.8 % (ref 35–47)
HGB BLD-MCNC: 10.1 G/DL (ref 11.7–15.7)
HGB BLD-MCNC: 10.3 G/DL (ref 11.7–15.7)
HGB BLD-MCNC: 8.4 G/DL (ref 11.7–15.7)
HGB BLD-MCNC: 8.7 G/DL (ref 11.7–15.7)
HGB BLD-MCNC: 8.9 G/DL (ref 11.7–15.7)
HGB BLD-MCNC: 9.7 G/DL (ref 11.7–15.7)
HGB UR QL STRIP: ABNORMAL
IMM GRANULOCYTES # BLD: 0 10E3/UL
IMM GRANULOCYTES NFR BLD: 1 %
INTERPRETATION ECG - MUSE: NORMAL
INTERPRETATION ECG - MUSE: NORMAL
KETONES UR STRIP-MCNC: 20 MG/DL
KETONES UR STRIP-MCNC: NEGATIVE MG/DL
KETONES UR STRIP-MCNC: NEGATIVE MG/DL
LACTATE SERPL-SCNC: 0.7 MMOL/L (ref 0.7–2)
LEUKOCYTE ESTERASE UR QL STRIP: ABNORMAL
LEUKOCYTE ESTERASE UR QL STRIP: NEGATIVE
LEUKOCYTE ESTERASE UR QL STRIP: NEGATIVE
LYMPHOCYTES # BLD AUTO: 0.4 10E3/UL (ref 0.8–5.3)
LYMPHOCYTES NFR BLD AUTO: 12 %
MAGNESIUM SERPL-MCNC: 1.5 MG/DL (ref 1.7–2.3)
MAGNESIUM SERPL-MCNC: 1.7 MG/DL (ref 1.7–2.3)
MAGNESIUM SERPL-MCNC: 1.7 MG/DL (ref 1.7–2.3)
MAGNESIUM SERPL-MCNC: 1.9 MG/DL (ref 1.7–2.3)
MAGNESIUM SERPL-MCNC: 2.5 MG/DL (ref 1.7–2.3)
MAGNESIUM SERPL-MCNC: 3 MG/DL (ref 1.7–2.3)
MCH RBC QN AUTO: 30.1 PG (ref 26.5–33)
MCH RBC QN AUTO: 30.3 PG (ref 26.5–33)
MCH RBC QN AUTO: 30.4 PG (ref 26.5–33)
MCH RBC QN AUTO: 30.7 PG (ref 26.5–33)
MCH RBC QN AUTO: 30.7 PG (ref 26.5–33)
MCHC RBC AUTO-ENTMCNC: 30.9 G/DL (ref 31.5–36.5)
MCHC RBC AUTO-ENTMCNC: 31 G/DL (ref 31.5–36.5)
MCHC RBC AUTO-ENTMCNC: 31.8 G/DL (ref 31.5–36.5)
MCHC RBC AUTO-ENTMCNC: 32.4 G/DL (ref 31.5–36.5)
MCHC RBC AUTO-ENTMCNC: 32.5 G/DL (ref 31.5–36.5)
MCV RBC AUTO: 94 FL (ref 78–100)
MCV RBC AUTO: 95 FL (ref 78–100)
MCV RBC AUTO: 96 FL (ref 78–100)
MCV RBC AUTO: 97 FL (ref 78–100)
MCV RBC AUTO: 99 FL (ref 78–100)
MONOCYTES # BLD AUTO: 0.2 10E3/UL (ref 0–1.3)
MONOCYTES NFR BLD AUTO: 6 %
MUCOUS THREADS #/AREA URNS LPF: PRESENT /LPF
NEUTROPHILS # BLD AUTO: 2.5 10E3/UL (ref 1.6–8.3)
NEUTROPHILS NFR BLD AUTO: 81 %
NITRATE UR QL: NEGATIVE
NRBC # BLD AUTO: 0 10E3/UL
NRBC BLD AUTO-RTO: 0 /100
P AXIS - MUSE: 74 DEGREES
P AXIS - MUSE: NORMAL DEGREES
PH UR STRIP: 5.5 [PH] (ref 5–7)
PH UR STRIP: 6 [PH] (ref 5–7)
PH UR STRIP: 6.5 [PH] (ref 5–7)
PLATELET # BLD AUTO: 148 10E3/UL (ref 150–450)
PLATELET # BLD AUTO: 149 10E3/UL (ref 150–450)
PLATELET # BLD AUTO: 186 10E3/UL (ref 150–450)
PLATELET # BLD AUTO: 187 10E3/UL (ref 150–450)
PLATELET # BLD AUTO: 194 10E3/UL (ref 150–450)
POTASSIUM SERPL-SCNC: 2.4 MMOL/L (ref 3.4–5.3)
POTASSIUM SERPL-SCNC: 2.4 MMOL/L (ref 3.4–5.3)
POTASSIUM SERPL-SCNC: 3.1 MMOL/L (ref 3.4–5.3)
POTASSIUM SERPL-SCNC: 3.1 MMOL/L (ref 3.4–5.3)
POTASSIUM SERPL-SCNC: 3.4 MMOL/L (ref 3.4–5.3)
POTASSIUM SERPL-SCNC: 3.5 MMOL/L (ref 3.4–5.3)
POTASSIUM SERPL-SCNC: 3.6 MMOL/L (ref 3.4–5.3)
POTASSIUM SERPL-SCNC: 3.8 MMOL/L (ref 3.4–5.3)
POTASSIUM SERPL-SCNC: 4 MMOL/L (ref 3.4–5.3)
POTASSIUM SERPL-SCNC: 4 MMOL/L (ref 3.4–5.3)
POTASSIUM SERPL-SCNC: 4.3 MMOL/L (ref 3.4–5.3)
POTASSIUM SERPL-SCNC: 4.4 MMOL/L (ref 3.4–5.3)
PR INTERVAL - MUSE: 166 MS
PR INTERVAL - MUSE: NORMAL MS
PROCALCITONIN SERPL IA-MCNC: 0.98 NG/ML
QRS DURATION - MUSE: 104 MS
QRS DURATION - MUSE: 116 MS
QT - MUSE: 422 MS
QT - MUSE: 476 MS
QTC - MUSE: 499 MS
QTC - MUSE: 502 MS
R AXIS - MUSE: -11 DEGREES
R AXIS - MUSE: -30 DEGREES
RBC # BLD AUTO: 2.79 10E6/UL (ref 3.8–5.2)
RBC # BLD AUTO: 2.83 10E6/UL (ref 3.8–5.2)
RBC # BLD AUTO: 2.9 10E6/UL (ref 3.8–5.2)
RBC # BLD AUTO: 3.32 10E6/UL (ref 3.8–5.2)
RBC # BLD AUTO: 3.4 10E6/UL (ref 3.8–5.2)
RBC URINE: 23 /HPF
RBC URINE: 37 /HPF
RBC URINE: <1 /HPF
SODIUM SERPL-SCNC: 139 MMOL/L (ref 135–145)
SODIUM SERPL-SCNC: 139 MMOL/L (ref 135–145)
SODIUM SERPL-SCNC: 140 MMOL/L (ref 135–145)
SODIUM SERPL-SCNC: 140 MMOL/L (ref 135–145)
SODIUM SERPL-SCNC: 141 MMOL/L (ref 135–145)
SODIUM SERPL-SCNC: 142 MMOL/L (ref 135–145)
SODIUM SERPL-SCNC: 142 MMOL/L (ref 135–145)
SP GR UR STRIP: 1.01 (ref 1–1.03)
SP GR UR STRIP: 1.01 (ref 1–1.03)
SP GR UR STRIP: 1.02 (ref 1–1.03)
SQUAMOUS EPITHELIAL: 1 /HPF
SQUAMOUS EPITHELIAL: 1 /HPF
SYSTOLIC BLOOD PRESSURE - MUSE: 133 MMHG
SYSTOLIC BLOOD PRESSURE - MUSE: NORMAL MMHG
T AXIS - MUSE: 56 DEGREES
T AXIS - MUSE: 74 DEGREES
TRANSITIONAL EPI: <1 /HPF
TSH SERPL DL<=0.005 MIU/L-ACNC: 3.93 UIU/ML (ref 0.3–4.2)
UROBILINOGEN UR STRIP-MCNC: <2 MG/DL
UROBILINOGEN UR STRIP-MCNC: NORMAL MG/DL
UROBILINOGEN UR STRIP-MCNC: NORMAL MG/DL
VENTRICULAR RATE- MUSE: 66 BPM
VENTRICULAR RATE- MUSE: 85 BPM
WBC # BLD AUTO: 3.1 10E3/UL (ref 4–11)
WBC # BLD AUTO: 3.3 10E3/UL (ref 4–11)
WBC # BLD AUTO: 3.9 10E3/UL (ref 4–11)
WBC # BLD AUTO: 3.9 10E3/UL (ref 4–11)
WBC # BLD AUTO: 4.7 10E3/UL (ref 4–11)
WBC URINE: 2 /HPF
WBC URINE: 54 /HPF
WBC URINE: <1 /HPF

## 2024-01-01 PROCEDURE — G1010 CDSM STANSON: HCPCS

## 2024-01-01 PROCEDURE — 83735 ASSAY OF MAGNESIUM: CPT | Performed by: EMERGENCY MEDICINE

## 2024-01-01 PROCEDURE — 97162 PT EVAL MOD COMPLEX 30 MIN: CPT | Mod: GP

## 2024-01-01 PROCEDURE — 250N000011 HC RX IP 250 OP 636: Performed by: STUDENT IN AN ORGANIZED HEALTH CARE EDUCATION/TRAINING PROGRAM

## 2024-01-01 PROCEDURE — 85027 COMPLETE CBC AUTOMATED: CPT | Performed by: STUDENT IN AN ORGANIZED HEALTH CARE EDUCATION/TRAINING PROGRAM

## 2024-01-01 PROCEDURE — 250N000011 HC RX IP 250 OP 636: Performed by: INTERNAL MEDICINE

## 2024-01-01 PROCEDURE — 82330 ASSAY OF CALCIUM: CPT | Performed by: STUDENT IN AN ORGANIZED HEALTH CARE EDUCATION/TRAINING PROGRAM

## 2024-01-01 PROCEDURE — 84132 ASSAY OF SERUM POTASSIUM: CPT | Performed by: NURSE PRACTITIONER

## 2024-01-01 PROCEDURE — 250N000013 HC RX MED GY IP 250 OP 250 PS 637: Performed by: INTERNAL MEDICINE

## 2024-01-01 PROCEDURE — 99233 SBSQ HOSP IP/OBS HIGH 50: CPT | Performed by: STUDENT IN AN ORGANIZED HEALTH CARE EDUCATION/TRAINING PROGRAM

## 2024-01-01 PROCEDURE — 99310 SBSQ NF CARE HIGH MDM 45: CPT | Performed by: NURSE PRACTITIONER

## 2024-01-01 PROCEDURE — 81001 URINALYSIS AUTO W/SCOPE: CPT | Performed by: INTERNAL MEDICINE

## 2024-01-01 PROCEDURE — 84132 ASSAY OF SERUM POTASSIUM: CPT | Performed by: STUDENT IN AN ORGANIZED HEALTH CARE EDUCATION/TRAINING PROGRAM

## 2024-01-01 PROCEDURE — 36415 COLL VENOUS BLD VENIPUNCTURE: CPT | Performed by: NURSE PRACTITIONER

## 2024-01-01 PROCEDURE — 81001 URINALYSIS AUTO W/SCOPE: CPT | Mod: ORL | Performed by: NURSE PRACTITIONER

## 2024-01-01 PROCEDURE — 99222 1ST HOSP IP/OBS MODERATE 55: CPT | Mod: AI | Performed by: INTERNAL MEDICINE

## 2024-01-01 PROCEDURE — 99309 SBSQ NF CARE MODERATE MDM 30: CPT | Mod: GV | Performed by: NURSE PRACTITIONER

## 2024-01-01 PROCEDURE — 97535 SELF CARE MNGMENT TRAINING: CPT | Mod: GO

## 2024-01-01 PROCEDURE — 96374 THER/PROPH/DIAG INJ IV PUSH: CPT | Mod: 59

## 2024-01-01 PROCEDURE — 93005 ELECTROCARDIOGRAM TRACING: CPT | Performed by: EMERGENCY MEDICINE

## 2024-01-01 PROCEDURE — 87086 URINE CULTURE/COLONY COUNT: CPT | Performed by: FAMILY MEDICINE

## 2024-01-01 PROCEDURE — 258N000003 HC RX IP 258 OP 636: Performed by: STUDENT IN AN ORGANIZED HEALTH CARE EDUCATION/TRAINING PROGRAM

## 2024-01-01 PROCEDURE — G0378 HOSPITAL OBSERVATION PER HR: HCPCS

## 2024-01-01 PROCEDURE — 97165 OT EVAL LOW COMPLEX 30 MIN: CPT | Mod: GO

## 2024-01-01 PROCEDURE — 70450 CT HEAD/BRAIN W/O DYE: CPT | Mod: MG

## 2024-01-01 PROCEDURE — 80048 BASIC METABOLIC PNL TOTAL CA: CPT | Performed by: FAMILY MEDICINE

## 2024-01-01 PROCEDURE — 93010 ELECTROCARDIOGRAM REPORT: CPT | Mod: HIP | Performed by: INTERNAL MEDICINE

## 2024-01-01 PROCEDURE — P9604 ONE-WAY ALLOW PRORATED TRIP: HCPCS | Performed by: FAMILY MEDICINE

## 2024-01-01 PROCEDURE — 80048 BASIC METABOLIC PNL TOTAL CA: CPT | Performed by: STUDENT IN AN ORGANIZED HEALTH CARE EDUCATION/TRAINING PROGRAM

## 2024-01-01 PROCEDURE — P9604 ONE-WAY ALLOW PRORATED TRIP: HCPCS | Performed by: NURSE PRACTITIONER

## 2024-01-01 PROCEDURE — G0463 HOSPITAL OUTPT CLINIC VISIT: HCPCS

## 2024-01-01 PROCEDURE — 71045 X-RAY EXAM CHEST 1 VIEW: CPT

## 2024-01-01 PROCEDURE — 83735 ASSAY OF MAGNESIUM: CPT | Performed by: STUDENT IN AN ORGANIZED HEALTH CARE EDUCATION/TRAINING PROGRAM

## 2024-01-01 PROCEDURE — 97110 THERAPEUTIC EXERCISES: CPT | Mod: GO

## 2024-01-01 PROCEDURE — 36415 COLL VENOUS BLD VENIPUNCTURE: CPT | Performed by: STUDENT IN AN ORGANIZED HEALTH CARE EDUCATION/TRAINING PROGRAM

## 2024-01-01 PROCEDURE — 81001 URINALYSIS AUTO W/SCOPE: CPT | Performed by: NURSE PRACTITIONER

## 2024-01-01 PROCEDURE — 97530 THERAPEUTIC ACTIVITIES: CPT | Mod: GP

## 2024-01-01 PROCEDURE — 99285 EMERGENCY DEPT VISIT HI MDM: CPT | Mod: 25

## 2024-01-01 PROCEDURE — 82565 ASSAY OF CREATININE: CPT | Performed by: STUDENT IN AN ORGANIZED HEALTH CARE EDUCATION/TRAINING PROGRAM

## 2024-01-01 PROCEDURE — 83605 ASSAY OF LACTIC ACID: CPT | Performed by: STUDENT IN AN ORGANIZED HEALTH CARE EDUCATION/TRAINING PROGRAM

## 2024-01-01 PROCEDURE — 87186 SC STD MICRODIL/AGAR DIL: CPT | Performed by: NURSE PRACTITIONER

## 2024-01-01 PROCEDURE — 250N000009 HC RX 250: Performed by: STUDENT IN AN ORGANIZED HEALTH CARE EDUCATION/TRAINING PROGRAM

## 2024-01-01 PROCEDURE — 87088 URINE BACTERIA CULTURE: CPT | Mod: ORL | Performed by: NURSE PRACTITIONER

## 2024-01-01 PROCEDURE — 96361 HYDRATE IV INFUSION ADD-ON: CPT

## 2024-01-01 PROCEDURE — 120N000001 HC R&B MED SURG/OB

## 2024-01-01 PROCEDURE — 99231 SBSQ HOSP IP/OBS SF/LOW 25: CPT | Performed by: PHYSICIAN ASSISTANT

## 2024-01-01 PROCEDURE — 80048 BASIC METABOLIC PNL TOTAL CA: CPT | Performed by: EMERGENCY MEDICINE

## 2024-01-01 PROCEDURE — 85027 COMPLETE CBC AUTOMATED: CPT | Performed by: FAMILY MEDICINE

## 2024-01-01 PROCEDURE — 84145 PROCALCITONIN (PCT): CPT | Performed by: STUDENT IN AN ORGANIZED HEALTH CARE EDUCATION/TRAINING PROGRAM

## 2024-01-01 PROCEDURE — 85018 HEMOGLOBIN: CPT | Performed by: NURSE PRACTITIONER

## 2024-01-01 PROCEDURE — 99239 HOSP IP/OBS DSCHRG MGMT >30: CPT | Performed by: INTERNAL MEDICINE

## 2024-01-01 PROCEDURE — 72100 X-RAY EXAM L-S SPINE 2/3 VWS: CPT

## 2024-01-01 PROCEDURE — 250N000013 HC RX MED GY IP 250 OP 250 PS 637: Performed by: STUDENT IN AN ORGANIZED HEALTH CARE EDUCATION/TRAINING PROGRAM

## 2024-01-01 PROCEDURE — 81001 URINALYSIS AUTO W/SCOPE: CPT | Performed by: FAMILY MEDICINE

## 2024-01-01 PROCEDURE — 83735 ASSAY OF MAGNESIUM: CPT | Performed by: NURSE PRACTITIONER

## 2024-01-01 PROCEDURE — 72131 CT LUMBAR SPINE W/O DYE: CPT | Mod: ME

## 2024-01-01 PROCEDURE — 93005 ELECTROCARDIOGRAM TRACING: CPT

## 2024-01-01 PROCEDURE — 36415 COLL VENOUS BLD VENIPUNCTURE: CPT | Performed by: EMERGENCY MEDICINE

## 2024-01-01 PROCEDURE — 258N000003 HC RX IP 258 OP 636: Performed by: EMERGENCY MEDICINE

## 2024-01-01 PROCEDURE — 84443 ASSAY THYROID STIM HORMONE: CPT | Performed by: STUDENT IN AN ORGANIZED HEALTH CARE EDUCATION/TRAINING PROGRAM

## 2024-01-01 PROCEDURE — 85025 COMPLETE CBC W/AUTO DIFF WBC: CPT | Performed by: EMERGENCY MEDICINE

## 2024-01-01 PROCEDURE — 87186 SC STD MICRODIL/AGAR DIL: CPT | Mod: ORL | Performed by: NURSE PRACTITIONER

## 2024-01-01 PROCEDURE — 72125 CT NECK SPINE W/O DYE: CPT | Mod: ME

## 2024-01-01 PROCEDURE — 99309 SBSQ NF CARE MODERATE MDM 30: CPT | Performed by: NURSE PRACTITIONER

## 2024-01-01 PROCEDURE — 99315 NF DSCHRG MGMT 30 MIN/LESS: CPT | Performed by: NURSE PRACTITIONER

## 2024-01-01 PROCEDURE — 87088 URINE BACTERIA CULTURE: CPT | Performed by: NURSE PRACTITIONER

## 2024-01-01 PROCEDURE — 80048 BASIC METABOLIC PNL TOTAL CA: CPT | Performed by: NURSE PRACTITIONER

## 2024-01-01 PROCEDURE — 36415 COLL VENOUS BLD VENIPUNCTURE: CPT | Performed by: FAMILY MEDICINE

## 2024-01-01 PROCEDURE — 97116 GAIT TRAINING THERAPY: CPT | Mod: GP

## 2024-01-01 PROCEDURE — 99204 OFFICE O/P NEW MOD 45 MIN: CPT | Performed by: PHYSICIAN ASSISTANT

## 2024-01-01 PROCEDURE — 250N000011 HC RX IP 250 OP 636: Performed by: EMERGENCY MEDICINE

## 2024-01-01 PROCEDURE — 85027 COMPLETE CBC AUTOMATED: CPT | Performed by: NURSE PRACTITIONER

## 2024-01-01 RX ORDER — HYDROMORPHONE HYDROCHLORIDE 2 MG/1
2 TABLET ORAL EVERY 6 HOURS
Qty: 60 TABLET | Refills: 0 | Status: SHIPPED | OUTPATIENT
Start: 2024-01-01 | End: 2024-01-01

## 2024-01-01 RX ORDER — AMOXICILLIN 250 MG
1 CAPSULE ORAL 2 TIMES DAILY PRN
Status: DISCONTINUED | OUTPATIENT
Start: 2024-01-01 | End: 2024-01-01 | Stop reason: HOSPADM

## 2024-01-01 RX ORDER — ACETAMINOPHEN 325 MG/1
650 TABLET ORAL EVERY 4 HOURS PRN
Status: DISCONTINUED | OUTPATIENT
Start: 2024-01-01 | End: 2024-01-01 | Stop reason: HOSPADM

## 2024-01-01 RX ORDER — SODIUM CHLORIDE, SODIUM LACTATE, POTASSIUM CHLORIDE, CALCIUM CHLORIDE 600; 310; 30; 20 MG/100ML; MG/100ML; MG/100ML; MG/100ML
INJECTION, SOLUTION INTRAVENOUS CONTINUOUS
Status: DISCONTINUED | OUTPATIENT
Start: 2024-01-01 | End: 2024-01-01

## 2024-01-01 RX ORDER — POTASSIUM CHLORIDE 1.5 G/1.58G
40 POWDER, FOR SOLUTION ORAL ONCE
Status: COMPLETED | OUTPATIENT
Start: 2024-01-01 | End: 2024-01-01

## 2024-01-01 RX ORDER — LANOLIN ALCOHOL/MO/W.PET/CERES
100 CREAM (GRAM) TOPICAL DAILY
DISCHARGE
Start: 2024-01-01 | End: 2024-01-01

## 2024-01-01 RX ORDER — OXYCODONE HYDROCHLORIDE 5 MG/1
5 TABLET ORAL EVERY 4 HOURS PRN
Status: DISCONTINUED | OUTPATIENT
Start: 2024-01-01 | End: 2024-01-01

## 2024-01-01 RX ORDER — HYDROMORPHONE HCL IN WATER/PF 6 MG/30 ML
0.2 PATIENT CONTROLLED ANALGESIA SYRINGE INTRAVENOUS EVERY 4 HOURS PRN
Status: DISCONTINUED | OUTPATIENT
Start: 2024-01-01 | End: 2024-01-01 | Stop reason: HOSPADM

## 2024-01-01 RX ORDER — MULTIVITAMIN,THERAPEUTIC
1 TABLET ORAL DAILY
COMMUNITY

## 2024-01-01 RX ORDER — LIDOCAINE 40 MG/G
CREAM TOPICAL
Status: DISCONTINUED | OUTPATIENT
Start: 2024-01-01 | End: 2024-01-01 | Stop reason: HOSPADM

## 2024-01-01 RX ORDER — POTASSIUM CHLORIDE 1500 MG/1
20 TABLET, EXTENDED RELEASE ORAL ONCE
Status: COMPLETED | OUTPATIENT
Start: 2024-01-01 | End: 2024-01-01

## 2024-01-01 RX ORDER — MULTIVITAMIN,THERAPEUTIC
1 TABLET ORAL DAILY
Status: DISCONTINUED | OUTPATIENT
Start: 2024-01-01 | End: 2024-01-01 | Stop reason: HOSPADM

## 2024-01-01 RX ORDER — AMOXICILLIN 250 MG
1 CAPSULE ORAL 2 TIMES DAILY PRN
DISCHARGE
Start: 2024-01-01

## 2024-01-01 RX ORDER — PROCHLORPERAZINE MALEATE 5 MG
5 TABLET ORAL EVERY 6 HOURS PRN
Status: DISCONTINUED | OUTPATIENT
Start: 2024-01-01 | End: 2024-01-01 | Stop reason: HOSPADM

## 2024-01-01 RX ORDER — PROCHLORPERAZINE 25 MG
12.5 SUPPOSITORY, RECTAL RECTAL EVERY 12 HOURS PRN
Status: DISCONTINUED | OUTPATIENT
Start: 2024-01-01 | End: 2024-01-01 | Stop reason: HOSPADM

## 2024-01-01 RX ORDER — LORAZEPAM 0.5 MG/1
0.5 TABLET ORAL EVERY 6 HOURS PRN
Qty: 60 TABLET | Refills: 1 | Status: SHIPPED | OUTPATIENT
Start: 2024-01-01

## 2024-01-01 RX ORDER — HYDROMORPHONE HYDROCHLORIDE 2 MG/1
2 TABLET ORAL EVERY 4 HOURS PRN
Qty: 5 TABLET | Refills: 0 | Status: SHIPPED | OUTPATIENT
Start: 2024-01-01 | End: 2024-01-01

## 2024-01-01 RX ORDER — SIMVASTATIN 10 MG
10 TABLET ORAL AT BEDTIME
Status: DISCONTINUED | OUTPATIENT
Start: 2024-01-01 | End: 2024-01-01 | Stop reason: HOSPADM

## 2024-01-01 RX ORDER — POTASSIUM CHLORIDE 1.5 G/1.58G
20 POWDER, FOR SOLUTION ORAL ONCE
Status: COMPLETED | OUTPATIENT
Start: 2024-01-01 | End: 2024-01-01

## 2024-01-01 RX ORDER — ACETAMINOPHEN 650 MG/1
650 SUPPOSITORY RECTAL EVERY 4 HOURS PRN
Status: DISCONTINUED | OUTPATIENT
Start: 2024-01-01 | End: 2024-01-01 | Stop reason: HOSPADM

## 2024-01-01 RX ORDER — CALCIUM GLUCONATE 20 MG/ML
1 INJECTION, SOLUTION INTRAVENOUS ONCE
Status: COMPLETED | OUTPATIENT
Start: 2024-01-01 | End: 2024-01-01

## 2024-01-01 RX ORDER — DRONABINOL 2.5 MG/1
2.5 CAPSULE ORAL 2 TIMES DAILY
Qty: 5 CAPSULE | Refills: 0 | Status: SHIPPED | OUTPATIENT
Start: 2024-01-01 | End: 2024-01-01

## 2024-01-01 RX ORDER — DEXAMETHASONE 4 MG/1
20 TABLET ORAL WEEKLY
COMMUNITY

## 2024-01-01 RX ORDER — MAGNESIUM OXIDE 400 MG/1
400 TABLET ORAL DAILY
DISCHARGE
Start: 2024-01-01 | End: 2024-01-01

## 2024-01-01 RX ORDER — CALCIUM CARBONATE/VITAMIN D3 600 MG-10
500 TABLET ORAL DAILY
Status: DISCONTINUED | OUTPATIENT
Start: 2024-01-01 | End: 2024-01-01 | Stop reason: HOSPADM

## 2024-01-01 RX ORDER — METOPROLOL SUCCINATE 50 MG/1
50 TABLET, EXTENDED RELEASE ORAL DAILY
COMMUNITY

## 2024-01-01 RX ORDER — PREGABALIN 50 MG/1
50 CAPSULE ORAL 2 TIMES DAILY
Status: ON HOLD | COMMUNITY
End: 2024-01-01

## 2024-01-01 RX ORDER — POTASSIUM CHLORIDE 750 MG/1
10 CAPSULE, EXTENDED RELEASE ORAL 2 TIMES DAILY
DISCHARGE
Start: 2024-01-01 | End: 2024-01-01

## 2024-01-01 RX ORDER — SODIUM CHLORIDE 9 MG/ML
INJECTION, SOLUTION INTRAVENOUS CONTINUOUS
Status: DISCONTINUED | OUTPATIENT
Start: 2024-01-01 | End: 2024-01-01

## 2024-01-01 RX ORDER — METOPROLOL SUCCINATE 50 MG/1
50 TABLET, EXTENDED RELEASE ORAL DAILY
Status: DISCONTINUED | OUTPATIENT
Start: 2024-01-01 | End: 2024-01-01 | Stop reason: HOSPADM

## 2024-01-01 RX ORDER — DRONABINOL 2.5 MG/1
2.5 CAPSULE ORAL 2 TIMES DAILY
Qty: 30 CAPSULE | Refills: 1 | Status: SHIPPED | OUTPATIENT
Start: 2024-01-01 | End: 2024-01-01

## 2024-01-01 RX ORDER — UREA 10 %
500 LOTION (ML) TOPICAL DAILY
COMMUNITY

## 2024-01-01 RX ORDER — PANTOPRAZOLE SODIUM 40 MG/1
40 TABLET, DELAYED RELEASE ORAL
DISCHARGE
Start: 2024-01-01

## 2024-01-01 RX ORDER — PREGABALIN 50 MG/1
50 CAPSULE ORAL 2 TIMES DAILY
Qty: 5 CAPSULE | Refills: 0 | Status: SHIPPED | OUTPATIENT
Start: 2024-01-01 | End: 2024-01-01

## 2024-01-01 RX ORDER — HYDROMORPHONE HYDROCHLORIDE 2 MG/1
2 TABLET ORAL EVERY 4 HOURS PRN
Qty: 30 TABLET | Refills: 0 | Status: SHIPPED | OUTPATIENT
Start: 2024-01-01 | End: 2024-01-01

## 2024-01-01 RX ORDER — ASPIRIN 81 MG/1
81 TABLET ORAL DAILY
Status: DISCONTINUED | OUTPATIENT
Start: 2024-01-01 | End: 2024-01-01 | Stop reason: HOSPADM

## 2024-01-01 RX ORDER — PANTOPRAZOLE SODIUM 40 MG/1
40 TABLET, DELAYED RELEASE ORAL
Status: DISCONTINUED | OUTPATIENT
Start: 2024-01-01 | End: 2024-01-01 | Stop reason: HOSPADM

## 2024-01-01 RX ORDER — PREGABALIN 50 MG/1
50 CAPSULE ORAL 2 TIMES DAILY
Status: DISCONTINUED | OUTPATIENT
Start: 2024-01-01 | End: 2024-01-01 | Stop reason: HOSPADM

## 2024-01-01 RX ORDER — CALCIUM CARBONATE 500 MG/1
1000 TABLET, CHEWABLE ORAL 4 TIMES DAILY PRN
Status: DISCONTINUED | OUTPATIENT
Start: 2024-01-01 | End: 2024-01-01 | Stop reason: HOSPADM

## 2024-01-01 RX ORDER — NALOXONE HYDROCHLORIDE 0.4 MG/ML
0.4 INJECTION, SOLUTION INTRAMUSCULAR; INTRAVENOUS; SUBCUTANEOUS
Status: DISCONTINUED | OUTPATIENT
Start: 2024-01-01 | End: 2024-01-01 | Stop reason: HOSPADM

## 2024-01-01 RX ORDER — DRONABINOL 2.5 MG/1
2.5 CAPSULE ORAL 2 TIMES DAILY
Qty: 60 CAPSULE | Refills: 0 | Status: SHIPPED | OUTPATIENT
Start: 2024-01-01

## 2024-01-01 RX ORDER — HYDROMORPHONE HYDROCHLORIDE 2 MG/1
2 TABLET ORAL EVERY 4 HOURS PRN
Qty: 24 TABLET | Refills: 0 | Status: SHIPPED | OUTPATIENT
Start: 2024-01-01 | End: 2024-01-01

## 2024-01-01 RX ORDER — AMOXICILLIN 250 MG
2 CAPSULE ORAL 2 TIMES DAILY PRN
Status: DISCONTINUED | OUTPATIENT
Start: 2024-01-01 | End: 2024-01-01 | Stop reason: HOSPADM

## 2024-01-01 RX ORDER — POTASSIUM CHLORIDE 1500 MG/1
40 TABLET, EXTENDED RELEASE ORAL ONCE
Status: COMPLETED | OUTPATIENT
Start: 2024-01-01 | End: 2024-01-01

## 2024-01-01 RX ORDER — NALOXONE HYDROCHLORIDE 0.4 MG/ML
0.2 INJECTION, SOLUTION INTRAMUSCULAR; INTRAVENOUS; SUBCUTANEOUS
Status: DISCONTINUED | OUTPATIENT
Start: 2024-01-01 | End: 2024-01-01 | Stop reason: HOSPADM

## 2024-01-01 RX ORDER — POTASSIUM CHLORIDE 750 MG/1
10 TABLET, EXTENDED RELEASE ORAL ONCE
Status: COMPLETED | OUTPATIENT
Start: 2024-01-01 | End: 2024-01-01

## 2024-01-01 RX ORDER — ASPIRIN 81 MG/1
81 TABLET ORAL DAILY
COMMUNITY

## 2024-01-01 RX ORDER — LENALIDOMIDE 10 MG/1
10 CAPSULE ORAL DAILY
COMMUNITY

## 2024-01-01 RX ORDER — CEPHALEXIN 500 MG/1
500 CAPSULE ORAL 3 TIMES DAILY
Qty: 15 CAPSULE | Refills: 0 | Status: SHIPPED | OUTPATIENT
Start: 2024-01-01 | End: 2024-01-01

## 2024-01-01 RX ORDER — FERROUS SULFATE 325(65) MG
325 TABLET ORAL
Status: DISCONTINUED | OUTPATIENT
Start: 2024-01-01 | End: 2024-01-01 | Stop reason: HOSPADM

## 2024-01-01 RX ORDER — HYDROMORPHONE HYDROCHLORIDE 2 MG/1
2 TABLET ORAL EVERY 4 HOURS PRN
Status: DISCONTINUED | OUTPATIENT
Start: 2024-01-01 | End: 2024-01-01 | Stop reason: HOSPADM

## 2024-01-01 RX ORDER — MORPHINE SULFATE 4 MG/ML
4 INJECTION, SOLUTION INTRAMUSCULAR; INTRAVENOUS ONCE
Status: COMPLETED | OUTPATIENT
Start: 2024-01-01 | End: 2024-01-01

## 2024-01-01 RX ORDER — PREGABALIN 50 MG/1
50 CAPSULE ORAL 2 TIMES DAILY
Qty: 62 CAPSULE | Refills: 0 | Status: SHIPPED | OUTPATIENT
Start: 2024-01-01

## 2024-01-01 RX ORDER — MAGNESIUM OXIDE 400 MG/1
400 TABLET ORAL EVERY 4 HOURS
Status: COMPLETED | OUTPATIENT
Start: 2024-01-01 | End: 2024-01-01

## 2024-01-01 RX ORDER — DRONABINOL 2.5 MG/1
2.5 CAPSULE ORAL 2 TIMES DAILY
Status: DISCONTINUED | OUTPATIENT
Start: 2024-01-01 | End: 2024-01-01 | Stop reason: HOSPADM

## 2024-01-01 RX ORDER — MAGNESIUM SULFATE 4 G/50ML
4 INJECTION INTRAVENOUS ONCE
Status: COMPLETED | OUTPATIENT
Start: 2024-01-01 | End: 2024-01-01

## 2024-01-01 RX ORDER — CALCIUM CARBONATE 500(1250)
1 TABLET ORAL DAILY
COMMUNITY

## 2024-01-01 RX ORDER — HYDROMORPHONE HYDROCHLORIDE 2 MG/1
2 TABLET ORAL EVERY 4 HOURS
Status: SHIPPED
Start: 2024-01-01

## 2024-01-01 RX ADMIN — HYDROMORPHONE HYDROCHLORIDE 2 MG: 2 TABLET ORAL at 06:28

## 2024-01-01 RX ADMIN — CALCIUM GLUCONATE 1 G: 20 INJECTION, SOLUTION INTRAVENOUS at 10:48

## 2024-01-01 RX ADMIN — OXYCODONE HYDROCHLORIDE 5 MG: 5 TABLET ORAL at 01:39

## 2024-01-01 RX ADMIN — THERA TABS 1 TABLET: TAB at 08:44

## 2024-01-01 RX ADMIN — PREGABALIN 50 MG: 50 CAPSULE ORAL at 01:00

## 2024-01-01 RX ADMIN — POTASSIUM CHLORIDE 40 MEQ: 1.5 POWDER, FOR SOLUTION ORAL at 05:27

## 2024-01-01 RX ADMIN — THERA TABS 1 TABLET: TAB at 08:32

## 2024-01-01 RX ADMIN — POTASSIUM CHLORIDE 10 MEQ: 750 TABLET, EXTENDED RELEASE ORAL at 13:39

## 2024-01-01 RX ADMIN — OXYCODONE HYDROCHLORIDE 5 MG: 5 TABLET ORAL at 14:43

## 2024-01-01 RX ADMIN — ACETAMINOPHEN 650 MG: 325 TABLET, FILM COATED ORAL at 15:16

## 2024-01-01 RX ADMIN — HYDROMORPHONE HYDROCHLORIDE 0.2 MG: 0.2 INJECTION, SOLUTION INTRAMUSCULAR; INTRAVENOUS; SUBCUTANEOUS at 01:09

## 2024-01-01 RX ADMIN — HYDROMORPHONE HYDROCHLORIDE 1 MG: 2 TABLET ORAL at 09:34

## 2024-01-01 RX ADMIN — PROCHLORPERAZINE EDISYLATE 5 MG: 5 INJECTION INTRAMUSCULAR; INTRAVENOUS at 08:23

## 2024-01-01 RX ADMIN — PANTOPRAZOLE SODIUM 40 MG: 40 INJECTION, POWDER, FOR SOLUTION INTRAVENOUS at 08:23

## 2024-01-01 RX ADMIN — ACETAMINOPHEN 650 MG: 325 TABLET, FILM COATED ORAL at 08:34

## 2024-01-01 RX ADMIN — DRONABINOL 2.5 MG: 2.5 CAPSULE ORAL at 20:35

## 2024-01-01 RX ADMIN — METOPROLOL SUCCINATE 50 MG: 50 TABLET, EXTENDED RELEASE ORAL at 08:32

## 2024-01-01 RX ADMIN — PREGABALIN 50 MG: 50 CAPSULE ORAL at 08:32

## 2024-01-01 RX ADMIN — CYANOCOBALAMIN TAB 250 MCG 500 MCG: 250 TAB at 08:43

## 2024-01-01 RX ADMIN — SIMVASTATIN 10 MG: 10 TABLET, FILM COATED ORAL at 01:00

## 2024-01-01 RX ADMIN — POTASSIUM CHLORIDE 20 MEQ: 1500 TABLET, EXTENDED RELEASE ORAL at 20:35

## 2024-01-01 RX ADMIN — PANTOPRAZOLE SODIUM 40 MG: 40 TABLET, DELAYED RELEASE ORAL at 06:32

## 2024-01-01 RX ADMIN — METOPROLOL SUCCINATE 50 MG: 50 TABLET, EXTENDED RELEASE ORAL at 09:35

## 2024-01-01 RX ADMIN — HYDROMORPHONE HYDROCHLORIDE 0.2 MG: 0.2 INJECTION, SOLUTION INTRAMUSCULAR; INTRAVENOUS; SUBCUTANEOUS at 05:40

## 2024-01-01 RX ADMIN — SIMVASTATIN 10 MG: 10 TABLET, FILM COATED ORAL at 20:38

## 2024-01-01 RX ADMIN — ACETAMINOPHEN 650 MG: 325 TABLET, FILM COATED ORAL at 09:35

## 2024-01-01 RX ADMIN — METOPROLOL SUCCINATE 50 MG: 50 TABLET, EXTENDED RELEASE ORAL at 08:43

## 2024-01-01 RX ADMIN — ASPIRIN 81 MG: 81 TABLET, COATED ORAL at 08:32

## 2024-01-01 RX ADMIN — SODIUM CHLORIDE 1000 ML: 9 INJECTION, SOLUTION INTRAVENOUS at 21:28

## 2024-01-01 RX ADMIN — PREGABALIN 50 MG: 50 CAPSULE ORAL at 08:44

## 2024-01-01 RX ADMIN — ASPIRIN 81 MG: 81 TABLET, COATED ORAL at 09:34

## 2024-01-01 RX ADMIN — THIAMINE HCL TAB 100 MG 100 MG: 100 TAB at 12:30

## 2024-01-01 RX ADMIN — MAGNESIUM SULFATE HEPTAHYDRATE 4 G: 80 INJECTION, SOLUTION INTRAVENOUS at 15:05

## 2024-01-01 RX ADMIN — PREGABALIN 50 MG: 50 CAPSULE ORAL at 20:35

## 2024-01-01 RX ADMIN — PREGABALIN 50 MG: 50 CAPSULE ORAL at 11:00

## 2024-01-01 RX ADMIN — FERROUS SULFATE TAB 325 MG (65 MG ELEMENTAL FE) 325 MG: 325 (65 FE) TAB at 08:32

## 2024-01-01 RX ADMIN — MAGNESIUM OXIDE TAB 400 MG (241.3 MG ELEMENTAL MG) 400 MG: 400 (241.3 MG) TAB at 13:39

## 2024-01-01 RX ADMIN — SENNOSIDES AND DOCUSATE SODIUM 1 TABLET: 8.6; 5 TABLET ORAL at 19:09

## 2024-01-01 RX ADMIN — HYDROMORPHONE HYDROCHLORIDE 0.2 MG: 0.2 INJECTION, SOLUTION INTRAMUSCULAR; INTRAVENOUS; SUBCUTANEOUS at 20:47

## 2024-01-01 RX ADMIN — DRONABINOL 2.5 MG: 2.5 CAPSULE ORAL at 08:44

## 2024-01-01 RX ADMIN — HYDROMORPHONE HYDROCHLORIDE 1 MG: 2 TABLET ORAL at 12:30

## 2024-01-01 RX ADMIN — POTASSIUM CHLORIDE 20 MEQ: 1.5 POWDER, FOR SOLUTION ORAL at 23:46

## 2024-01-01 RX ADMIN — SODIUM CHLORIDE: 9 INJECTION, SOLUTION INTRAVENOUS at 13:03

## 2024-01-01 RX ADMIN — PROCHLORPERAZINE MALEATE 5 MG: 5 TABLET ORAL at 15:16

## 2024-01-01 RX ADMIN — ACETAMINOPHEN 650 MG: 325 TABLET, FILM COATED ORAL at 05:17

## 2024-01-01 RX ADMIN — MORPHINE SULFATE 4 MG: 4 INJECTION, SOLUTION INTRAMUSCULAR; INTRAVENOUS at 21:05

## 2024-01-01 RX ADMIN — HYDROMORPHONE HYDROCHLORIDE 1 MG: 2 TABLET ORAL at 03:21

## 2024-01-01 RX ADMIN — PROCHLORPERAZINE EDISYLATE 5 MG: 5 INJECTION INTRAMUSCULAR; INTRAVENOUS at 19:55

## 2024-01-01 RX ADMIN — FERROUS SULFATE TAB 325 MG (65 MG ELEMENTAL FE) 325 MG: 325 (65 FE) TAB at 08:44

## 2024-01-01 RX ADMIN — HYDROMORPHONE HYDROCHLORIDE 1 MG: 2 TABLET ORAL at 19:09

## 2024-01-01 RX ADMIN — POTASSIUM CHLORIDE 40 MEQ: 1.5 POWDER, FOR SOLUTION ORAL at 21:32

## 2024-01-01 RX ADMIN — SODIUM CHLORIDE, POTASSIUM CHLORIDE, SODIUM LACTATE AND CALCIUM CHLORIDE: 600; 310; 30; 20 INJECTION, SOLUTION INTRAVENOUS at 10:44

## 2024-01-01 RX ADMIN — ASPIRIN 81 MG: 81 TABLET, COATED ORAL at 08:44

## 2024-01-01 RX ADMIN — SODIUM CHLORIDE, POTASSIUM CHLORIDE, SODIUM LACTATE AND CALCIUM CHLORIDE: 600; 310; 30; 20 INJECTION, SOLUTION INTRAVENOUS at 01:15

## 2024-01-01 RX ADMIN — SODIUM CHLORIDE, POTASSIUM CHLORIDE, SODIUM LACTATE AND CALCIUM CHLORIDE: 600; 310; 30; 20 INJECTION, SOLUTION INTRAVENOUS at 13:48

## 2024-01-01 RX ADMIN — PANTOPRAZOLE SODIUM 40 MG: 40 INJECTION, POWDER, FOR SOLUTION INTRAVENOUS at 13:04

## 2024-01-01 RX ADMIN — POTASSIUM CHLORIDE 40 MEQ: 1.5 POWDER, FOR SOLUTION ORAL at 14:15

## 2024-01-01 RX ADMIN — HYDROMORPHONE HYDROCHLORIDE 0.2 MG: 0.2 INJECTION, SOLUTION INTRAMUSCULAR; INTRAVENOUS; SUBCUTANEOUS at 14:21

## 2024-01-01 RX ADMIN — OXYCODONE HYDROCHLORIDE 5 MG: 5 TABLET ORAL at 11:08

## 2024-01-01 RX ADMIN — POTASSIUM CHLORIDE 40 MEQ: 1500 TABLET, EXTENDED RELEASE ORAL at 13:35

## 2024-01-01 RX ADMIN — HYDROMORPHONE HYDROCHLORIDE 1 MG: 2 TABLET ORAL at 15:16

## 2024-01-01 RX ADMIN — MAGNESIUM OXIDE TAB 400 MG (241.3 MG ELEMENTAL MG) 400 MG: 400 (241.3 MG) TAB at 16:05

## 2024-01-01 RX ADMIN — ACETAMINOPHEN 650 MG: 325 TABLET, FILM COATED ORAL at 01:39

## 2024-01-01 RX ADMIN — HYDROMORPHONE HYDROCHLORIDE 2 MG: 2 TABLET ORAL at 16:05

## 2024-01-01 RX ADMIN — CYANOCOBALAMIN TAB 250 MCG 500 MCG: 250 TAB at 08:32

## 2024-01-01 ASSESSMENT — ACTIVITIES OF DAILY LIVING (ADL)
ADLS_ACUITY_SCORE: 51
ADLS_ACUITY_SCORE: 45
ADLS_ACUITY_SCORE: 45
ADLS_ACUITY_SCORE: 38
ADLS_ACUITY_SCORE: 52
ADLS_ACUITY_SCORE: 47
ADLS_ACUITY_SCORE: 47
ADLS_ACUITY_SCORE: 45
ADLS_ACUITY_SCORE: 47
ADLS_ACUITY_SCORE: 51
ADLS_ACUITY_SCORE: 51
ADLS_ACUITY_SCORE: 36
ADLS_ACUITY_SCORE: 48
ADLS_ACUITY_SCORE: 51
ADLS_ACUITY_SCORE: 48
ADLS_ACUITY_SCORE: 47
ADLS_ACUITY_SCORE: 45
ADLS_ACUITY_SCORE: 47
ADLS_ACUITY_SCORE: 45
ADLS_ACUITY_SCORE: 44
DEPENDENT_IADLS:: INDEPENDENT
ADLS_ACUITY_SCORE: 51
ADLS_ACUITY_SCORE: 38
ADLS_ACUITY_SCORE: 45
ADLS_ACUITY_SCORE: 51
ADLS_ACUITY_SCORE: 51
ADLS_ACUITY_SCORE: 52
ADLS_ACUITY_SCORE: 38
ADLS_ACUITY_SCORE: 51
ADLS_ACUITY_SCORE: 48
ADLS_ACUITY_SCORE: 51
ADLS_ACUITY_SCORE: 51
ADLS_ACUITY_SCORE: 47
ADLS_ACUITY_SCORE: 36
ADLS_ACUITY_SCORE: 45
ADLS_ACUITY_SCORE: 51
ADLS_ACUITY_SCORE: 52
ADLS_ACUITY_SCORE: 47
ADLS_ACUITY_SCORE: 51
ADLS_ACUITY_SCORE: 45
ADLS_ACUITY_SCORE: 35
ADLS_ACUITY_SCORE: 51
ADLS_ACUITY_SCORE: 45
ADLS_ACUITY_SCORE: 35
ADLS_ACUITY_SCORE: 51
ADLS_ACUITY_SCORE: 51
ADLS_ACUITY_SCORE: 45
ADLS_ACUITY_SCORE: 51
ADLS_ACUITY_SCORE: 51
ADLS_ACUITY_SCORE: 47
ADLS_ACUITY_SCORE: 52
ADLS_ACUITY_SCORE: 36
ADLS_ACUITY_SCORE: 51
ADLS_ACUITY_SCORE: 51
ADLS_ACUITY_SCORE: 47
ADLS_ACUITY_SCORE: 51
ADLS_ACUITY_SCORE: 45
ADLS_ACUITY_SCORE: 51
ADLS_ACUITY_SCORE: 47
ADLS_ACUITY_SCORE: 35
ADLS_ACUITY_SCORE: 51
ADLS_ACUITY_SCORE: 35
ADLS_ACUITY_SCORE: 45
ADLS_ACUITY_SCORE: 51
ADLS_ACUITY_SCORE: 35
ADLS_ACUITY_SCORE: 45
ADLS_ACUITY_SCORE: 47
ADLS_ACUITY_SCORE: 48
ADLS_ACUITY_SCORE: 47
ADLS_ACUITY_SCORE: 38
ADLS_ACUITY_SCORE: 51
ADLS_ACUITY_SCORE: 51
ADLS_ACUITY_SCORE: 45
ADLS_ACUITY_SCORE: 35
ADLS_ACUITY_SCORE: 51
ADLS_ACUITY_SCORE: 47

## 2024-01-01 ASSESSMENT — COLUMBIA-SUICIDE SEVERITY RATING SCALE - C-SSRS
2. HAVE YOU ACTUALLY HAD ANY THOUGHTS OF KILLING YOURSELF IN THE PAST MONTH?: NO
6. HAVE YOU EVER DONE ANYTHING, STARTED TO DO ANYTHING, OR PREPARED TO DO ANYTHING TO END YOUR LIFE?: NO
1. IN THE PAST MONTH, HAVE YOU WISHED YOU WERE DEAD OR WISHED YOU COULD GO TO SLEEP AND NOT WAKE UP?: NO

## 2024-08-25 PROBLEM — S32.020A CLOSED COMPRESSION FRACTURE OF L2 LUMBAR VERTEBRA, INITIAL ENCOUNTER (H): Status: ACTIVE | Noted: 2024-01-01

## 2024-08-25 PROBLEM — S01.01XA SCALP LACERATION, INITIAL ENCOUNTER: Status: ACTIVE | Noted: 2024-01-01
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
## 2024-08-25 NOTE — ED PROVIDER NOTES
EMERGENCY DEPARTMENT ENCOUNTER      NAME: Jacquelin Sampson  AGE: 84 year old female  YOB: 1940  MRN: [id number]  EVALUATION DATE & TIME: 8/25/2024  6:18 PM    PCP: Vicente Hernandez    ED PROVIDER: Shahnaz Espinosa M.D.      Chief Complaint   Patient presents with    Fall    Laceration     FINAL IMPRESSION:  1. Closed compression fracture of L2 lumbar vertebra, initial encounter (H)    2. Scalp laceration, initial encounter    3. Fall, initial encounter      ED COURSE & MEDICAL DECISION MAKING:    Pertinent Labs & Imaging studies reviewed. (See chart for details)  ED Course as of 08/25/24 2134   Sun Aug 25, 2024   1915 Patient is a pleasant 84-year-old for evaluation after a fall.  She was not very forthcoming in what happened today but her  did tell me that she fell and has had repeated falls with no clear cause.  She had a fall a week ago Saturday and hurt her back and has been in bed ever since.  She has been taking increased doses of narcotics that she has multiple myeloma to help manage her back pain.  She is not on any blood thinners.  She does a laceration the back of her head that looks to be about 1 cm with no active bleeding.  I told her we could place a stitch orders stable but that we did not have to.  She does not want 1.  She denies feeling dizzy or lightheaded and denies syncope.  She denies being weak.  She says she just falls.  Will get some blood work on her as well as CT imaging of her head and spine.  I discussed all this with her.  She is not wanting to stay in the hospital but sounds like she is not ambulatory at home and not safe to be able to go home.  I did get some collaborative information from her family members.   2040 Patient is a hemoglobin of 8.7 which is down from 11 a year ago.   2057 I discussed the case with neurosurgery.  They recommend MRI.  Patient will be admitted to the hospitalist for further evaluation.   2122 I check back with the patient family.   Updated on the findings and plan for admission to the hospital.  She is in agreement with the plan.  She would like to eat and drink and I told her that is fine.  Will work on getting her a bed upstairs.   2125 I did talk to the patient family about her hemoglobin of 8.7.  It sounds like it similar to where she has been recently with multiple myeloma.  She gets labs done at [facility].   2132 I spoke with Dr. Garcia, hospitalist, about the patient and the plan for admission.    2134 I did talk to family and they wanted everyone to be aware that Fidel Eulalio is the hematologist at [facility] that follows the patient.       Medical Decision Making    History:  Supplemental history from: Family  External Record(s) reviewed: I reviewed the note from urgency room on 7/26/2024.  Patient was being seen for frequent falls and chronic back pain.  They had x-rays of the thoracic and lumbar spine which showed no acute findings.    Work Up:  Emergent/Severe conditions considered and evaluated for: Spinal fracture, dehydration, electrolyte abnormality, intracranial hemorrhage  I independently reviewed and interpreted EKG and lumbar spine CT which showed compression at L2.  See full radiology report for all details.  In additional to work up documented, I considered the following work up: None  Medications given that require intensive monitoring for toxicity: IV morphine    External consultation:  Discussion of management with another provider: Neurosurgery, hospitalist    Complicating factors:  Care impacted by chronic illness: Multiple myeloma, hyperlipidemia, chronic pain  Care affected by social determinants of health: Access to care    Disposition considerations: Admission      No MIPS measures identified.    At the conclusion of the encounter I discussed  the results of all of the tests and the disposition with patient.   All questions were answered.  The patient acknowledged understanding and was involved in the decision making  "regarding the overall care plan.      MEDICATIONS GIVEN IN THE EMERGENCY:  Medications   morphine (PF) injection 4 mg (4 mg Intravenous $Given 8/25/24 2105)   sodium chloride 0.9% BOLUS 1,000 mL (1,000 mLs Intravenous $New Bag 8/25/24 2128)     =================================================================    HPI    Triage Note:    Patient information was obtained from: Patient    Use of : N/A      Jacquelin Sampson is a 84 year old female who presents with injuries following a fall.    Per chart review: Patient was seen on 7/26/2024 at the urgency room-[place] for evaluation of back pain following a fall.  X-ray of the back showed multiple old compression fractures of her thoracic and lumbar spine but no new fractures.  Discussed the findings with the patient.  Recommended continuing at home morphine and following up with pain management.    Earlier today, patient reports reports having an unwitnessed fall.  She states that she was unsure why she fell but she \"just fell.\"  Upon further discussion, patient states that she was using her walker and just tipped over.  During the fall, patient did hit her head and she has a laceration on the occipital scalp region.  Patient states also having a fall last Saturday (8 days ago) where she \"fell hard,\" and had associated lower back pain following this.  She mentions that since this fall she has been bedridden and unable to move or stand/walk.    Due to the increased lower back pain, patient has increased her daily morphine dosage from 2 pills to anywhere between 4 and 6 pills daily.  Family state the patient is normally supposed to take 2 pills/day, but lately has been taking more due to the pain.  Family mentions patient has taken 6 pills of morphine today.  Family states that patient has these pain pills due to having multiple melanoma that she has been getting treatment for for the past 14 years.     states that patient did see neurologist " roughly 3 to 4 weeks ago where nothing was found to be abnormal.  They did, however, place a heart monitor on her.  Again, they state they do not see cardiology for another 2 weeks or so.    Per patient, she states that she has been eating and drinking initially normally has.  However, upon further discussion with family they state the patient has been eating much less than normal.  Family endorses that patient has not eaten today.    Family states that patient lives in a split-level house with many stairs.  Her  feels as if he is no longer able to care for her properly due to her intense pain.  Family states that today following the fall it took roughly 2 hours to get the patient up from the floor secondary to her pain.  Once up, patient was unable to pull herself up with a walker due to intense pain.    Patient states that she does take a daily dose of aspirin.  Denies any dizziness, lightheadedness, weakness, chest pain, or trouble breathing prior to the fall.  Denies loss of consciousness during the fall.  Denies medical thinner.    PAST MEDICAL HISTORY:  No past medical history on file.    PAST SURGICAL HISTORY:  No past surgical history on file.    CURRENT MEDICATIONS:    No current facility-administered medications for this encounter.    Current Outpatient Medications:     aspirin 325 MG tablet, Take 325 mg by mouth daily., Disp: , Rfl:     BACLOFEN PO, Take 10 mg by mouth every evening., Disp: , Rfl:     Cholecalciferol (VITAMIN D3 PO), Take 1,000 Units by mouth daily., Disp: , Rfl:     ferrous sulfate 325 (65 FE) MG tablet, Take 325 mg by mouth daily (with breakfast)., Disp: , Rfl:     HYDROmorphone (DILAUDID) 2 MG tablet, Take 1-2 tablets by mouth every 3 hours as needed for pain., Disp: 30 tablet, Rfl: 0    HYDROmorphone HCl (DILAUDID PO), Take 2 mg by mouth every 4 hours as needed., Disp: , Rfl:     Nadolol (CORGARD PO), Take 40 mg by mouth daily., Disp: , Rfl:     SIMVASTATIN PO, Take 10 mg by  mouth daily., Disp: , Rfl:     ALLERGIES:  No Known Allergies    FAMILY HISTORY:  No family history on file.    SOCIAL HISTORY:   Social History     Socioeconomic History    Marital status:      Social Determinants of Health     Financial Resource Strain: Low Risk  (10/7/2023)    Received from [facility]    Overall Financial Resource Strain (CARDIA)     Difficulty of Paying Living Expenses: Not very hard   Food Insecurity: No Food Insecurity (10/7/2023)    Received from [facility]    Hunger Vital Sign     Worried About Running Out of Food in the Last Year: Never true     Ran Out of Food in the Last Year: Never true   Transportation Needs: No Transportation Needs (10/7/2023)    Received from [facility]    PRAPARE - Transportation     Lack of Transportation (Medical): No     Lack of Transportation (Non-Medical): No   Physical Activity: Inactive (10/7/2023)    Received from [facility]    Exercise Vital Sign     Days of Exercise per Week: 0 days     Minutes of Exercise per Session: 60 min   Stress: No Stress Concern Present (10/2/2022)    Received from [facility]    [place] [employer] - Occupational Stress Questionnaire     Feeling of Stress : Not at all   Social Connections: Moderately Integrated (10/2/2022)    Received from [facility]    Social Connection and Isolation Panel [NHANES]     Frequency of Communication with Friends and Family: More than three times a week     Frequency of Social Gatherings with Friends and Family: More than three times a week     Attends Zoroastrianism Services: More than 4 times per year     Active Member of Clubs or Organizations: No     Attends Club or Organization Meetings: Patient declined     Marital Status:    Interpersonal Safety: Not At Risk (10/2/2022)    Received from [facility]    Humiliation, Afraid, Rape, and Kick questionnaire     Fear of Current  "or Ex-Partner: No     Emotionally Abused: No     Physically Abused: No     Sexually Abused: No   Housing Stability: Low Risk  (10/7/2023)    [facility]    Housing Stability     What is your living situation today?: I have a steady place to live       PHYSICAL EXAM    VITAL SIGNS: BP (!) 183/74   Pulse 69   Temp 98.9  F (37.2  C) (Oral)   Resp 19   Ht 1.575 m (5' 2\")   Wt 52.6 kg (116 lb)   SpO2 99%   BMI 21.22 kg/m     GENERAL: Awake, alert, answering questions appropriately.  SPEECH:  Easy to understand speech, Normal volume and ciro  PULMONARY: No respiratory distress, Lungs clear to auscultation bilaterally  CARDIOVASCULAR: Regular rate and rhythm, Distal pulses present and normal.  ABDOMINAL: Soft, Nondistended, Nontender, No rebound or guarding, No palpable masses  MUSCULOSKELETAL: Tenderness to the upper lumbar spine.  EXTREMITIES: No lower extremity edema.  INTEGUMENT: 1 cm laceration with a hematoma to the posterior scalp.  No active bleeding present.  PSYCH: Normal mood and affect     LAB:  All pertinent labs reviewed and interpreted.  Results for orders placed or performed during the hospital encounter of 08/25/24   CT Thoracic Spine w/o Contrast    Impression    IMPRESSION:  CERVICAL SPINE CT:  1. No fracture or posttraumatic subluxation.    THORACIC SPINE CT:  1. No acute fracture or posttraumatic subluxation.  2. Chronic T6, T7, T8, and T11 compression fractures    LUMBAR SPINE CT:  1.  Acute L2 compression fracture with 40% height loss and 5 mm retropulsion of fracture fragments into the canal. This results in mild canal stenosis.  2.  Chronic L1 compression fracture.  3.  Severe left neural foraminal narrowing at L4-L5.  4.  Moderate to severe canal stenosis at L2-L3.   CT Head w/o Contrast    Impression    IMPRESSION:  1.  No CT evidence for acute intracranial process.  2.  Brain atrophy and presumed chronic microvascular ischemic changes as above.   CT Cervical " Spine w/o Contrast    Impression    IMPRESSION:  CERVICAL SPINE CT:  1. No fracture or posttraumatic subluxation.    THORACIC SPINE CT:  1. No acute fracture or posttraumatic subluxation.  2. Chronic T6, T7, T8, and T11 compression fractures    LUMBAR SPINE CT:  1.  Acute L2 compression fracture with 40% height loss and 5 mm retropulsion of fracture fragments into the canal. This results in mild canal stenosis.  2.  Chronic L1 compression fracture.  3.  Severe left neural foraminal narrowing at L4-L5.  4.  Moderate to severe canal stenosis at L2-L3.   CT Lumbar Spine w/o Contrast    Impression    IMPRESSION:  CERVICAL SPINE CT:  1. No fracture or posttraumatic subluxation.    THORACIC SPINE CT:  1. No acute fracture or posttraumatic subluxation.  2. Chronic T6, T7, T8, and T11 compression fractures    LUMBAR SPINE CT:  1.  Acute L2 compression fracture with 40% height loss and 5 mm retropulsion of fracture fragments into the canal. This results in mild canal stenosis.  2.  Chronic L1 compression fracture.  3.  Severe left neural foraminal narrowing at L4-L5.  4.  Moderate to severe canal stenosis at L2-L3.   Basic metabolic panel   Result Value Ref Range    Sodium 139 135 - 145 mmol/L    Potassium 3.5 3.4 - 5.3 mmol/L    Chloride 100 98 - 107 mmol/L    Carbon Dioxide (CO2) 25 22 - 29 mmol/L    Anion Gap 14 7 - 15 mmol/L    Urea Nitrogen 22.4 8.0 - 23.0 mg/dL    Creatinine 0.87 0.51 - 0.95 mg/dL    GFR Estimate 65 >60 mL/min/1.73m2    Calcium 8.7 (L) 8.8 - 10.4 mg/dL    Glucose 137 (H) 70 - 99 mg/dL   Result Value Ref Range    Magnesium 1.7 1.7 - 2.3 mg/dL   CBC with platelets and differential   Result Value Ref Range    WBC Count 3.1 (L) 4.0 - 11.0 10e3/uL    RBC Count 2.83 (L) 3.80 - 5.20 10e6/uL    Hemoglobin 8.7 (L) 11.7 - 15.7 g/dL    Hematocrit 26.8 (L) 35.0 - 47.0 %    MCV 95 78 - 100 fL    MCH 30.7 26.5 - 33.0 pg    MCHC 32.5 31.5 - 36.5 g/dL    RDW 15.2 (H) 10.0 - 15.0 %    Platelet Count 148 (L) 150 - 450  10e3/uL    % Neutrophils 81 %    % Lymphocytes 12 %    % Monocytes 6 %    % Eosinophils 0 %    % Basophils 0 %    % Immature Granulocytes 1 %    NRBCs per 100 WBC 0 <1 /100    Absolute Neutrophils 2.5 1.6 - 8.3 10e3/uL    Absolute Lymphocytes 0.4 (L) 0.8 - 5.3 10e3/uL    Absolute Monocytes 0.2 0.0 - 1.3 10e3/uL    Absolute Eosinophils 0.0 0.0 - 0.7 10e3/uL    Absolute Basophils 0.0 0.0 - 0.2 10e3/uL    Absolute Immature Granulocytes 0.0 <=0.4 10e3/uL    Absolute NRBCs 0.0 10e3/uL       RADIOLOGY:  CT Lumbar Spine w/o Contrast   Final Result   IMPRESSION:   CERVICAL SPINE CT:   1. No fracture or posttraumatic subluxation.      THORACIC SPINE CT:   1. No acute fracture or posttraumatic subluxation.   2. Chronic T6, T7, T8, and T11 compression fractures      LUMBAR SPINE CT:   1.  Acute L2 compression fracture with 40% height loss and 5 mm retropulsion of fracture fragments into the canal. This results in mild canal stenosis.   2.  Chronic L1 compression fracture.   3.  Severe left neural foraminal narrowing at L4-L5.   4.  Moderate to severe canal stenosis at L2-L3.      CT Thoracic Spine w/o Contrast   Final Result   IMPRESSION:   CERVICAL SPINE CT:   1. No fracture or posttraumatic subluxation.      THORACIC SPINE CT:   1. No acute fracture or posttraumatic subluxation.   2. Chronic T6, T7, T8, and T11 compression fractures      LUMBAR SPINE CT:   1.  Acute L2 compression fracture with 40% height loss and 5 mm retropulsion of fracture fragments into the canal. This results in mild canal stenosis.   2.  Chronic L1 compression fracture.   3.  Severe left neural foraminal narrowing at L4-L5.   4.  Moderate to severe canal stenosis at L2-L3.      CT Cervical Spine w/o Contrast   Final Result   IMPRESSION:   CERVICAL SPINE CT:   1. No fracture or posttraumatic subluxation.      THORACIC SPINE CT:   1. No acute fracture or posttraumatic subluxation.   2. Chronic T6, T7, T8, and T11 compression fractures      LUMBAR SPINE  CT:   1.  Acute L2 compression fracture with 40% height loss and 5 mm retropulsion of fracture fragments into the canal. This results in mild canal stenosis.   2.  Chronic L1 compression fracture.   3.  Severe left neural foraminal narrowing at L4-L5.   4.  Moderate to severe canal stenosis at L2-L3.      CT Head w/o Contrast   Final Result   IMPRESSION:   1.  No CT evidence for acute intracranial process.   2.  Brain atrophy and presumed chronic microvascular ischemic changes as above.      Lumbar spine MRI w/o contrast    (Results Pending)         EKG:    Date and time: August 25, 2024 at 1831  Rate: 66 bpm  Rhythm: Sinus rhythm  DC interval: 166 ms  QRS interval: 104 ms  QT/QTc: 476/499 ms  ST changes or T wave changes: No acute ST or T wave abnormality  Change from prior ECG: No prior to compare to  I have independently reviewed and interpreted this EKG.     Shahnaz Espinosa M.D.  Emergency Medicine  [facility] EMERGENCY DEPARTMENT  [facility] [facility] [id number] [phone]  Dept: [phone]       Shahnaz Espinosa MD  08/25/24 3748

## 2024-08-25 NOTE — ED NOTES
Bed: JN-24  Expected date: 8/25/24  Expected time: 6:11 PM  Means of arrival: Ambulance  Comments:  [facility]  85 yo F Fall head lac

## 2024-08-25 NOTE — ED NOTES
"Patient reporting a \"bad fall\" from last week in which she hurt her back. Patient did not come in for assessment of that injury. Denies changes to bowel and bladder at the time, but did have one episode of bladder incontinence while transporting to hospital. Denies numbness and tingling. Patient has history of multiple myeloma and takes morphine at home for pain. States 1-2 pills a day usually work for her, but since the back injury, she has been taking 4 or so.   "

## 2024-08-25 NOTE — ED NOTES
Patient daughter stepped out in hallway with safety concerns for patient and patients spouse no longer being able to care enough for patient needs as patient has been having frequent falls and spouse is unable to help assist her back up due to his own health concerns. Patient reportedly fell in kitchen and dragged herself to bathroom prior to paramedics arriving.

## 2024-08-25 NOTE — ED TRIAGE NOTES
Patient brought in via [facility] from home after a fall,  present and denies LOC or use of thinners. Uses walker and cane baseline. Bleeding controlled and clean by paramedics. Denies changes to vision or headaches. Denies chest pain or SOB. Has history of frequent falls and has been seen for this in the past, wore an event monitor with no explanation. Disoriented to time. Does not remember this fall, but remembers other recent history.  present at bedside.      Triage Assessment (Adult)       Row Name 08/25/24 1833 08/25/24 1832       Triage Assessment    Airway WDL WDL WDL       Respiratory WDL    Respiratory WDL WDL WDL       Cardiac WDL    Cardiac WDL WDL WDL       Peripheral/Neurovascular WDL    Peripheral Neurovascular WDL WDL --       Cognitive/Neuro/Behavioral WDL    Cognitive/Neuro/Behavioral WDL X X    Level of Consciousness intermittent confusion confused    Arousal Level opens eyes spontaneously opens eyes spontaneously    Orientation disoriented to;time disoriented to;time    Speech clear;spontaneous clear;spontaneous    Mood/Behavior flat affect flat affect       Pupils (CN II)    Pupil PERRLA yes yes

## 2024-08-26 PROBLEM — R53.1 WEAKNESS: Status: ACTIVE | Noted: 2024-01-01

## 2024-08-26 PROBLEM — M54.50 ACUTE MIDLINE LOW BACK PAIN WITHOUT SCIATICA: Status: ACTIVE | Noted: 2024-01-01

## 2024-08-26 NOTE — CONSULTS
[facility] Nurse Inpatient Assessment     Consulted for: occipital laceration    Summary: patient has multiple bruises noted to upper arms visible outside of blanket    Patient History (according to provider note(s):      Jacquelin Sampson is 84 year old female with history of recurrent falls, multiple myeloma, hyperlipidemia, and polyneuropathy who presents to the ER with recurrent falls     She was in her usual state of health until about a week ago when she fell and was unable to get out of bed.  Patient has been laying in bed since that time and has been taking analgesics for pain without relief.  Today patient fell again while trying to get up and sustained laceration to the back of her head.  She denies syncope, loss of consciousness, no seizure activity.  She denies fever, chills, nausea or vomiting or dizziness.  Per report, oral intake has been poor as patient has not been eating or drinking well.  She follows up at [facility].  She has a history of chronic back pain for which she was last seen in the clinic last month.  Patient is a retired nurse.  Family is unable to care for patient at home as patient is requiring higher level of care.     Blood pressure was elevated at 183/74, with heart rate of 69, respiratory 19 and oxygen saturation of 99% on room air.  Calcium level was 8.7 otherwise unremarkable BMP, WBC 3.1, hemoglobin 8.7, and platelet count 148.     Spinal CT revealed chronic T6, T7, T8, and T11 compression fractures, acute L2 compression fracture with 40% height loss and 5 mm retropulsion of fracture fragments into the canal, chronic L1 compression fracture, severe left neural foraminal narrowing at L4-L5 and moderate to severe canal stenosis at L2-L3.  She received morphine and normal saline in the ER.  Neurosurgery service recommends lumbar MRI for further evaluation of lumbar fracture with retropulsion with plan to evaluate patient tomorrow morning.        Assessment:      Skin Injury Location: L occiput    8/26    Last photo: 8/26  Skin injury due to: Abrasion  Skin history and plan of care:   patient   Affected area:      Skin assessment: Ecchymosis and Erosion of epidermis     Measurements (length x width x depth, in cm) 3.5  x 1.5  x  0 cm      Color: normal and consistent with surrounding tissue     Temperature  normal      Drainage: none .      Color: none      Odor: none  Pain: mild, tender  Pain interventions prior to dressing change: slow and gentle cares   Treatment goal:  no advanced wound care needed  STATUS: initial assessment  Supplies ordered: discussed with patient ; no wound care items needed       Treatment Plan:     No open wound, use gentle soap when washing hair, monitor for wound opening.    Orders: Reviewed    RECOMMEND PRIMARY TEAM ORDER: None, at this time  Education provided: importance of repositioning and plan of care  Discussed plan of care with: Patient  WOC nurse follow-up plan: weekly  Notify WOC if wound(s) deteriorate.  Nursing to notify the Provider(s) and re-consult the WOC Nurse if new skin concern.    DATA:     Current support surface: Standard  Standard Isoflex gel  Containment of urine/stool: Incontinence Protocol  BMI: Body mass index is 21.22 kg/m .   Active diet order: Orders Placed This Encounter      Combination Diet Low Saturated Fat Na <2400mg Diet, No Caffeine Diet     Output: I/O last 3 completed shifts:  In: -   Out: 400 [Urine:400]     Labs:   Recent Labs   Lab 08/25/24 1947   HGB 8.7*   WBC 3.1*     Pressure injury risk assessment:   Sensory Perception: 4-->no impairment  Moisture: 3-->occasionally moist  Activity: 1-->bedfast  Mobility: 3-->slightly limited  Nutrition: 3-->adequate  Friction and Shear: 2-->potential problem  Chris Score: 16    JOAN Perez RN CWOCN  Pager no longer in use, please contact through [employer] group: [facility] [employer]

## 2024-08-26 NOTE — PHARMACY-ADMISSION MEDICATION HISTORY
Pharmacist Admission Medication History    Admission medication history is complete. The information provided in this note is only as accurate as the sources available at the time of the update.    Information Source(s): Patient and CareEverywhere/SureScripts via in-person    Pertinent Information:     Changes made to PTA medication list:  Added: Revlimid, dexamethasone, Lyrica, metoprolol, B12, multivit, calcium   Deleted: baclofen, nadolol   Changed: ASA to 81 mg    Allergies reviewed with patient and updates made in EHR: yes    Medication History Completed By: GIOVANNI CARLTON RPH 8/25/2024 10:04 PM    PTA Med List   Medication Sig Last Dose    aspirin 81 MG EC tablet Take 81 mg by mouth daily. 8/25/2024 at am    calcium carbonate (OS-ESTELA) 500 MG tablet Take 1 tablet by mouth daily. 8/25/2024 at am    cyanocobalamin (VITAMIN B-12) 500 MCG tablet Take 500 mcg by mouth daily. 8/25/2024 at am    dexAMETHasone (DECADRON) 4 MG tablet Take 20 mg by mouth once a week. On Saturdays 8/24/2024 at am    ferrous sulfate 325 (65 FE) MG tablet Take 325 mg by mouth daily (with breakfast). 8/25/2024 at am    HYDROmorphone HCl (DILAUDID PO) Take 2 mg by mouth every 4 hours as needed. 8/25/2024 at am    LENalidomide (REVLIMID) 10 MG CAPS capsule Take 10 mg by mouth daily For 21 days, then 1 week off 8/24/2024 at just started week off    metoprolol succinate ER (TOPROL XL) 50 MG 24 hr tablet Take 50 mg by mouth daily. 8/24/2024 at am    multivitamin, therapeutic (THERA-VIT) TABS tablet Take 1 tablet by mouth daily. 8/25/2024 at am    pregabalin (LYRICA) 50 MG capsule Take 50 mg by mouth 2 times daily. 8/25/2024 at am    simvastatin (ZOCOR) 10 MG tablet Take 10 mg by mouth at bedtime. 8/24/2024 at hs

## 2024-08-26 NOTE — H&P
[facility]    History and Physical - Hospitalist Service       Date of Admission:  8/25/2024  Jacquelin Sampson is 84 year old female with history of recurrent falls, multiple myeloma, hyperlipidemia, and polyneuropathy admitted on 8/25/2024 with recurrent falls, occipital laceration and acute L2 compression fracture.    Neurosurgery service recommends lumbar MRI.    Acute L2 fracture  Recurrent falls  Severe L2-L3 stenosis  Recurrent falls is possibly secondary to deconditioning from severe DJD.  Denies syncopal event, seizures or loss of consciousness.  Spinal CT revealed chronic T6, T7, T8, and T11 compression fractures, acute L2 compression fracture with 40% height loss and 5 mm retropulsion of fracture fragments into the canal, chronic L1 compression fracture, severe left neural foraminal narrowing at L4-L5 and moderate to severe canal stenosis at L2-L3.      Neurosurgery service recommends lumbar MRI for further evaluation of lumbar fracture with retropulsion with plan to evaluate patient tomorrow morning.       Lumbar MRI as planned  Strict bedrest for now  PT/OT after evaluation by neurosurgery service tomorrow  Analgesics as needed  Fall precaution  Follow-up neurosurgery consult      Occipital laceration  Wound care as per wound care nurse    Multiple myeloma  PET scan on 4/2/2024 revealed no evidence of relapse or progression of multiple myeloma and no significant change compared to PET scan performed on 3/23/2022 PET/CT.  Patient follows up at [facility]      Polyneuropathy  Resume PTA pregabalin    Hypertension  Resume PTA metoprolol succinate 50 mg daily    Hyperlipidemia  Resume PTA simvastatin      Diet: Combination Diet Low Saturated Fat Na <2400mg Diet, No Caffeine Diet  DVT Prophylaxis: Pneumatic Compression Devices  Parkinson Catheter: Not present  Lines: None     Cardiac Monitoring: None  Code Status: Full Code    Clinically Significant Risk Factors Present on Admission                 # Drug Induced Platelet Defect: home medication list includes an antiplatelet medication      # Anemia: based on hgb <11                        Disposition Plan     Medically Ready for Discharge: Anticipated in 2-4 Days           Pablito Garcia MD  Hospitalist Service  [facility]  Securely message with obopay (more info)  Text page via [facility] Paging/Directory     ______________________________________________________________________    Chief Complaint   Recurrent fall, back pain    History is obtained from the patient    History of Present Illness   Jacquelin Sampson is 84 year old female with history of recurrent falls, multiple myeloma, hyperlipidemia, and polyneuropathy who presents to the ER with recurrent falls    She was in her usual state of health until about a week ago when she fell and was unable to get out of bed.  Patient has been laying in bed since that time and has been taking analgesics for pain without relief.  Today patient fell again while trying to get up and sustained laceration to the back of her head.  She denies syncope, loss of consciousness, no seizure activity.  She denies fever, chills, nausea or vomiting or dizziness.  Per report, oral intake has been poor as patient has not been eating or drinking well.  She follows up at [facility].  She has a history of chronic back pain for which she was last seen in the clinic last month.  Patient is a retired nurse.  Family is unable to care for patient at home as patient is requiring higher level of care.    Blood pressure was elevated at 183/74, with heart rate of 69, respiratory 19 and oxygen saturation of 99% on room air.  Calcium level was 8.7 otherwise unremarkable BMP, WBC 3.1, hemoglobin 8.7, and platelet count 148.    Spinal CT revealed chronic T6, T7, T8, and T11 compression fractures, acute L2 compression fracture with 40% height loss and 5 mm retropulsion of fracture fragments into the canal, chronic  L1 compression fracture, severe left neural foraminal narrowing at L4-L5 and moderate to severe canal stenosis at L2-L3.  She received morphine and normal saline in the ER.  Neurosurgery service recommends lumbar MRI for further evaluation of lumbar fracture with retropulsion with plan to evaluate patient tomorrow morning.         Past Medical History    No past medical history on file.    Past Surgical History   No past surgical history on file.    Prior to Admission Medications   Prior to Admission Medications   Prescriptions Last Dose Informant Patient Reported? Taking?   HYDROmorphone HCl (DILAUDID PO) 8/25/2024 at am Self Yes Yes   Sig: Take 2 mg by mouth every 4 hours as needed.   LENalidomide (REVLIMID) 10 MG CAPS capsule 8/24/2024 at just started week off  Yes Yes   Sig: Take 10 mg by mouth daily For 21 days, then 1 week off   aspirin 81 MG EC tablet 8/25/2024 at am  Yes Yes   Sig: Take 81 mg by mouth daily.   calcium carbonate (OS-ESTELA) 500 MG tablet 8/25/2024 at am  Yes Yes   Sig: Take 1 tablet by mouth daily.   cyanocobalamin (VITAMIN B-12) 500 MCG tablet 8/25/2024 at am  Yes Yes   Sig: Take 500 mcg by mouth daily.   dexAMETHasone (DECADRON) 4 MG tablet 8/24/2024 at am  Yes Yes   Sig: Take 20 mg by mouth once a week. On Saturdays   ferrous sulfate 325 (65 FE) MG tablet 8/25/2024 at am Self Yes Yes   Sig: Take 325 mg by mouth daily (with breakfast).   metoprolol succinate ER (TOPROL XL) 50 MG 24 hr tablet 8/24/2024 at am  Yes Yes   Sig: Take 50 mg by mouth daily.   multivitamin, therapeutic (THERA-VIT) TABS tablet 8/25/2024 at am  Yes Yes   Sig: Take 1 tablet by mouth daily.   pregabalin (LYRICA) 50 MG capsule 8/25/2024 at am  Yes Yes   Sig: Take 50 mg by mouth 2 times daily.   simvastatin (ZOCOR) 10 MG tablet 8/24/2024 at hs Self Yes Yes   Sig: Take 10 mg by mouth at bedtime.      Facility-Administered Medications: None        Review of Systems    The 10 point Review of Systems is negative other than noted  in the HPI or here.     Physical Exam   Vital Signs: Temp: 98.9  F (37.2  C) Temp src: Oral BP: (!) 165/83 Pulse: 59   Resp: 23 SpO2: 100 % O2 Device: None (Room air)    Weight: 116 lbs 0 oz    General appearance: Awake, Alert, Cooperative, not in any obvious distress and appears stated age   HEENT: Normocephalic, atraumatic, conjunctiva clear without icterus and ears without discharge  Lungs: Clear to auscultation bilaterally, no wheezing, good air exchange, normal work of breathing  Cardiovascular: Regular Rate and Rythm, normal apical impulse, normal S1 and S2, no lower extremity edema bilaterally  Abdomen: Soft, non-tender and Non-distended, active bowel sounds  Skin: 3 to 4 cm laceration in the occipital region.  Musculoskeletal: No bony deformities or joint tenderness. Normal ROM upon flexion & extension.   Neurologic: Alert & Oriented X 3, Facial symmetry preserved and upper & lower extremities moving well with symmetry  Psychiatric: Calm, normal eye contact and normal affect      Medical Decision Making       60 MINUTES SPENT BY ME on the date of service doing chart review, history, exam, documentation & further activities per the note.      Data     I have personally reviewed the following data over the past 24 hrs:    3.1 (L)  \   8.7 (L)   / 148 (L)     139 100 22.4 /  137 (H)   3.5 25 0.87 \       Imaging results reviewed over the past 24 hrs:   Recent Results (from the past 24 hour(s))   CT Head w/o Contrast    Narrative    EXAM: CT HEAD W/O CONTRAST  LOCATION: [facility]  DATE: 8/25/2024    INDICATION: trauma  COMPARISON: 9/19/2023.  TECHNIQUE: Routine CT Head without IV contrast. Multiplanar reformats. Dose reduction techniques were used.    FINDINGS:  INTRACRANIAL CONTENTS: No intracranial hemorrhage, extraaxial collection, or mass effect.  No CT evidence of acute infarct. Chronic right thalamic lacunar infarct. Advanced presumed chronic small vessel ischemic changes.  Moderate generalized volume loss.   No hydrocephalus.     VISUALIZED ORBITS/SINUSES/MASTOIDS: No intraorbital abnormality. No paranasal sinus mucosal disease. No middle ear or mastoid effusion.    BONES/SOFT TISSUES: No acute abnormality.      Impression    IMPRESSION:  1.  No CT evidence for acute intracranial process.  2.  Brain atrophy and presumed chronic microvascular ischemic changes as above.   CT Cervical Spine w/o Contrast    Narrative    EXAM: CT CERVICAL SPINE W/O CONTRAST, CT LUMBAR SPINE W/O CONTRAST, CT THORACIC SPINE W/O CONTRAST  LOCATION: [facility]  DATE: 8/25/2024    INDICATION: trauma  COMPARISON: 7/26/2024 radiographs.  TECHNIQUE:  1) Routine CT Cervical Spine without IV contrast. Multiplanar reformats. Dose reduction techniques were used.   2) Routine CT Thoracic Spine without IV contrast. Multiplanar reformats. Dose reduction techniques were used.   3) Routine CT Lumbar Spine without IV contrast. Multiplanar reformats. Dose reduction techniques were used.     FINDINGS:    CERVICAL SPINE CT:  VERTEBRA: Diffuse osteopenia. Normal vertebral body heights and alignment. No fracture or posttraumatic subluxation. Multilevel degenerative disc disease, greatest at C4-C5, C5-C6, and C6-C7.    CANAL/FORAMINA: No high-grade canal or neural foraminal stenosis.    PARASPINAL: No extraspinal abnormality.    THORACIC SPINE CT:  VERTEBRA: Diffuse osteopenia. Thoracic dextrocurvature. Exaggerated thoracic kyphosis due to chronic T6, T7, and T8 compression fractures. Additional chronic T11 compression fracture is noted. No new or acute fracture. No posttraumatic subluxation.   Multilevel spondylosis.    CANAL/FORAMINA: No high-grade canal or neural foraminal stenosis.    PARASPINAL: Numerous calcified splenic granulomas and calcified hilar/mediastinal lymph nodes, suggestive of remote healed granulomatous disease. Aortic atherosclerotic calcifications.    LUMBAR SPINE CT:  VERTEBRA:  Diffuse osteopenia. Levocurvature. Somewhat straightened lumbar lordosis without significant spondylolisthesis. Chronic L1 compression fracture. Acute L2 compression fracture with approximately 40% height loss. Small cortical defect through the   superior endplate and sclerosis paralleling the superior endplate. 5 mm  retropulsion of fracture fragments into the canal. L4-L5 posterior decompression. Multilevel spondylosis.    CANAL/FORAMINA: Suspected severe left neural foraminal narrowing at L4-L5. Otherwise, no high-grade neural foraminal narrowing. Mild canal stenosis at L2 due to retropulsion of fracture fragments. Suspected moderate to severe L2-L3 canal stenosis.    PARASPINAL: Large nonobstructing right renal stone.      Impression    IMPRESSION:  CERVICAL SPINE CT:  1. No fracture or posttraumatic subluxation.    THORACIC SPINE CT:  1. No acute fracture or posttraumatic subluxation.  2. Chronic T6, T7, T8, and T11 compression fractures    LUMBAR SPINE CT:  1.  Acute L2 compression fracture with 40% height loss and 5 mm retropulsion of fracture fragments into the canal. This results in mild canal stenosis.  2.  Chronic L1 compression fracture.  3.  Severe left neural foraminal narrowing at L4-L5.  4.  Moderate to severe canal stenosis at L2-L3.   CT Thoracic Spine w/o Contrast    Narrative    EXAM: CT CERVICAL SPINE W/O CONTRAST, CT LUMBAR SPINE W/O CONTRAST, CT THORACIC SPINE W/O CONTRAST  LOCATION: [facility]  DATE: 8/25/2024    INDICATION: trauma  COMPARISON: 7/26/2024 radiographs.  TECHNIQUE:  1) Routine CT Cervical Spine without IV contrast. Multiplanar reformats. Dose reduction techniques were used.   2) Routine CT Thoracic Spine without IV contrast. Multiplanar reformats. Dose reduction techniques were used.   3) Routine CT Lumbar Spine without IV contrast. Multiplanar reformats. Dose reduction techniques were used.     FINDINGS:    CERVICAL SPINE CT:  VERTEBRA: Diffuse  osteopenia. Normal vertebral body heights and alignment. No fracture or posttraumatic subluxation. Multilevel degenerative disc disease, greatest at C4-C5, C5-C6, and C6-C7.    CANAL/FORAMINA: No high-grade canal or neural foraminal stenosis.    PARASPINAL: No extraspinal abnormality.    THORACIC SPINE CT:  VERTEBRA: Diffuse osteopenia. Thoracic dextrocurvature. Exaggerated thoracic kyphosis due to chronic T6, T7, and T8 compression fractures. Additional chronic T11 compression fracture is noted. No new or acute fracture. No posttraumatic subluxation.   Multilevel spondylosis.    CANAL/FORAMINA: No high-grade canal or neural foraminal stenosis.    PARASPINAL: Numerous calcified splenic granulomas and calcified hilar/mediastinal lymph nodes, suggestive of remote healed granulomatous disease. Aortic atherosclerotic calcifications.    LUMBAR SPINE CT:  VERTEBRA: Diffuse osteopenia. Levocurvature. Somewhat straightened lumbar lordosis without significant spondylolisthesis. Chronic L1 compression fracture. Acute L2 compression fracture with approximately 40% height loss. Small cortical defect through the   superior endplate and sclerosis paralleling the superior endplate. 5 mm  retropulsion of fracture fragments into the canal. L4-L5 posterior decompression. Multilevel spondylosis.    CANAL/FORAMINA: Suspected severe left neural foraminal narrowing at L4-L5. Otherwise, no high-grade neural foraminal narrowing. Mild canal stenosis at L2 due to retropulsion of fracture fragments. Suspected moderate to severe L2-L3 canal stenosis.    PARASPINAL: Large nonobstructing right renal stone.      Impression    IMPRESSION:  CERVICAL SPINE CT:  1. No fracture or posttraumatic subluxation.    THORACIC SPINE CT:  1. No acute fracture or posttraumatic subluxation.  2. Chronic T6, T7, T8, and T11 compression fractures    LUMBAR SPINE CT:  1.  Acute L2 compression fracture with 40% height loss and 5 mm retropulsion of fracture fragments  into the canal. This results in mild canal stenosis.  2.  Chronic L1 compression fracture.  3.  Severe left neural foraminal narrowing at L4-L5.  4.  Moderate to severe canal stenosis at L2-L3.   CT Lumbar Spine w/o Contrast    Narrative    EXAM: CT CERVICAL SPINE W/O CONTRAST, CT LUMBAR SPINE W/O CONTRAST, CT THORACIC SPINE W/O CONTRAST  LOCATION: [facility]  DATE: 8/25/2024    INDICATION: trauma  COMPARISON: 7/26/2024 radiographs.  TECHNIQUE:  1) Routine CT Cervical Spine without IV contrast. Multiplanar reformats. Dose reduction techniques were used.   2) Routine CT Thoracic Spine without IV contrast. Multiplanar reformats. Dose reduction techniques were used.   3) Routine CT Lumbar Spine without IV contrast. Multiplanar reformats. Dose reduction techniques were used.     FINDINGS:    CERVICAL SPINE CT:  VERTEBRA: Diffuse osteopenia. Normal vertebral body heights and alignment. No fracture or posttraumatic subluxation. Multilevel degenerative disc disease, greatest at C4-C5, C5-C6, and C6-C7.    CANAL/FORAMINA: No high-grade canal or neural foraminal stenosis.    PARASPINAL: No extraspinal abnormality.    THORACIC SPINE CT:  VERTEBRA: Diffuse osteopenia. Thoracic dextrocurvature. Exaggerated thoracic kyphosis due to chronic T6, T7, and T8 compression fractures. Additional chronic T11 compression fracture is noted. No new or acute fracture. No posttraumatic subluxation.   Multilevel spondylosis.    CANAL/FORAMINA: No high-grade canal or neural foraminal stenosis.    PARASPINAL: Numerous calcified splenic granulomas and calcified hilar/mediastinal lymph nodes, suggestive of remote healed granulomatous disease. Aortic atherosclerotic calcifications.    LUMBAR SPINE CT:  VERTEBRA: Diffuse osteopenia. Levocurvature. Somewhat straightened lumbar lordosis without significant spondylolisthesis. Chronic L1 compression fracture. Acute L2 compression fracture with approximately 40% height loss.  Small cortical defect through the   superior endplate and sclerosis paralleling the superior endplate. 5 mm  retropulsion of fracture fragments into the canal. L4-L5 posterior decompression. Multilevel spondylosis.    CANAL/FORAMINA: Suspected severe left neural foraminal narrowing at L4-L5. Otherwise, no high-grade neural foraminal narrowing. Mild canal stenosis at L2 due to retropulsion of fracture fragments. Suspected moderate to severe L2-L3 canal stenosis.    PARASPINAL: Large nonobstructing right renal stone.      Impression    IMPRESSION:  CERVICAL SPINE CT:  1. No fracture or posttraumatic subluxation.    THORACIC SPINE CT:  1. No acute fracture or posttraumatic subluxation.  2. Chronic T6, T7, T8, and T11 compression fractures    LUMBAR SPINE CT:  1.  Acute L2 compression fracture with 40% height loss and 5 mm retropulsion of fracture fragments into the canal. This results in mild canal stenosis.  2.  Chronic L1 compression fracture.  3.  Severe left neural foraminal narrowing at L4-L5.  4.  Moderate to severe canal stenosis at L2-L3.

## 2024-08-26 NOTE — ED NOTES
Verbal report called to BHUMIKA Boyd on P4. Oliver made aware that patient will come to floor after MRI.

## 2024-08-26 NOTE — PLAN OF CARE
PRIMARY DIAGNOSIS: GENERALIZED WEAKNESS    OUTPATIENT/OBSERVATION GOALS TO BE MET BEFORE DISCHARGE  1. Orthostatic performed: N/A    2. Tolerating PO medications: No    3. Return to near baseline physical activity: No    4. Cleared for discharge by consultants (if involved): No    Discharge Planner Nurse   Safe discharge environment identified: No  Barriers to discharge: Yes       Entered by: Nader Vale RN 08/26/2024 3:06 PM     Please review provider order for any additional goals.   Nurse to notify provider when observation goals have been met and patient is ready for discharge.Goal Outcome Evaluation:

## 2024-08-26 NOTE — CONSULTS
Care Management Initial Consult    General Information  Assessment completed with: Patient, Patient  Type of CM/SW Visit: Initial Assessment    Primary Care Provider verified and updated as needed: Yes   Readmission within the last 30 days: no previous admission in last 30 days      Reason for Consult: discharge planning  Advance Care Planning: Advance Care Planning Reviewed: verified with patient        Communication Assessment  Patient's communication style: spoken language (English or Bilingual)        Cognitive  Cognitive/Neuro/Behavioral: .WDL except, orientation  Level of Consciousness: intermittent confusion  Arousal Level: opens eyes spontaneously  Orientation: disoriented to, situation  Mood/Behavior: flat affect  Best Language: 0 - No aphasia  Speech: clear, spontaneous    Living Environment:   People in home: spouse  Foreign  Current living Arrangements: house      Able to return to prior arrangements: yes     Family/Social Support:  Care provided by: self  Provides care for: no one  Marital Status:     Foreign       Description of Support System: Supportive    Support Assessment: Adequate family and caregiver support    Current Resources:   Patient receiving home care services: No     Community Resources: None  Equipment currently used at home: cane, straight  Supplies currently used at home: None    Employment/Financial:  Employment Status: retired        Financial Concerns: none   Referral to Financial Worker: No     Does the patient's insurance plan have a 3 day qualifying hospital stay waiver?  No    Lifestyle & Psychosocial Needs:  Social Determinants of Health     Food Insecurity: No Food Insecurity (10/7/2023)    Received from [facility], [facility]    Hunger Vital Sign     Worried About Running Out of Food in the Last Year: Never true     Ran Out of Food in the Last Year: Never true   Depression: Not at risk (7/11/2024)    Received from [facility]     PHQ-2     PHQ-2 TOTAL SCORE: 0   Housing Stability: Low Risk  (10/7/2023)    Received from [facility]    Housing Stability     What is your living situation today?: I have a steady place to live   Tobacco Use: Low Risk  (7/29/2024)    Received from [facility]    Patient History     Smoking Tobacco Use: Never     Smokeless Tobacco Use: Never     Passive Exposure: Not on file   Financial Resource Strain: Low Risk  (10/7/2023)    Received from [facility]    Overall Financial Resource Strain (CARDIA)     Difficulty of Paying Living Expenses: Not very hard   Alcohol Use: Not At Risk (2/10/2020)    Received from [facility]    AUDIT-C     Frequency of Alcohol Consumption: Never     Average Number of Drinks: Not on file     Frequency of Binge Drinking: Never   Transportation Needs: No Transportation Needs (10/7/2023)    Received from [facility]    PRAPARE - Transportation     Lack of Transportation (Medical): No     Lack of Transportation (Non-Medical): No   Physical Activity: Inactive (10/7/2023)    Received from [facility]    Exercise Vital Sign     Days of Exercise per Week: 0 days     Minutes of Exercise per Session: 60 min   Interpersonal Safety: Not At Risk (10/2/2022)    Received from [facility]    Humiliation, Afraid, Rape, and Kick questionnaire     Fear of Current or Ex-Partner: No     Emotionally Abused: No     Physically Abused: No     Sexually Abused: No   Stress: No Stress Concern Present (10/2/2022)    Received from [facility]    [employer] [place] [employer] - Occupational Stress Questionnaire     Feeling of Stress : Not at all   Social Connections: Moderately Integrated (10/2/2022)    Received from [facility]    Social Connection and Isolation Panel [NHANES]     Frequency of Communication with Friends and Family: More than three times a week      Frequency of Social Gatherings with Friends and Family: More than three times a week     Attends [employer] Services: More than 4 times per year     Active Member of Clubs or Organizations: No     Attends Club or Organization Meetings: Patient declined     Marital Status:    Health Literacy: Not on file     Functional Status:  Prior to admission patient needed assistance:   Dependent ADLs:: Independent  Dependent IADLs:: Independent       Additional Information:  Writer met with patient at bedside to review role of care management services, discuss goals of care and assess need for any possible services at discharge. Patient alert, answering questions appropriately and engaged in the conversation. Address, phone number and PCP confirmed. Patient reported no HCD. Lives with spouse in a house. Report independent with ADLs/IADLs. Walk with cane. No community resources and no other DME. Goal is to return home. Aniticipate family will transport.     Met with patient to review observation status billing under Medicare guidelines and provide a copy of the [facility] brochure. Patient alert, answering questions appropriately and engaged in the conversation.     Vanda Loera RN

## 2024-08-26 NOTE — PLAN OF CARE
Problem: Pain Acute  Goal: Optimal Pain Control and Function  Outcome: Progressing  Intervention: Develop Pain Management Plan  Recent Flowsheet Documentation  Taken 8/26/2024 0139 by Odalis Selby RN  Pain Management Interventions:   medication (see MAR)   pillow support provided   repositioned     Problem: Mobility Impairment  Goal: Optimal Mobility  Outcome: Not Progressing   Goal Outcome Evaluation:    Alert & oriented with intermittent confusion/forgetfulness. Mid to low back pain managed with PRN Tylenol and oxycodone. Able to turn and reposition slowly and with assist of 1. Bedrest at present. Utilizing purewick for voiding. Also had urinary incontinence. Neurosurgery consult ordered; scheduled to see pt today.

## 2024-08-26 NOTE — PROGRESS NOTES
[facility] Neurosurgery  Telephone Note     Contacted by Dr. Shahnaz Espinosa at [facility]'s ED.      84F with a mechanical fall a week ago and has been in bed ever since. She had another fall today resulting in head injury and laceration which prompted her presentation to the ED.      Per ED:  Neurologically intact.  Generalized weakness.    Imaging:  EXAM: CT CERVICAL SPINE W/O CONTRAST, CT LUMBAR SPINE W/O CONTRAST, CT THORACIC SPINE W/O CONTRAST  LOCATION: [facility]  DATE: 8/25/2024     INDICATION: trauma  COMPARISON: 7/26/2024 radiographs.  TECHNIQUE:  1) Routine CT Cervical Spine without IV contrast. Multiplanar reformats. Dose reduction techniques were used.   2) Routine CT Thoracic Spine without IV contrast. Multiplanar reformats. Dose reduction techniques were used.   3) Routine CT Lumbar Spine without IV contrast. Multiplanar reformats. Dose reduction techniques were used.      FINDINGS:     CERVICAL SPINE CT:  VERTEBRA: Diffuse osteopenia. Normal vertebral body heights and alignment. No fracture or posttraumatic subluxation. Multilevel degenerative disc disease, greatest at C4-C5, C5-C6, and C6-C7.     CANAL/FORAMINA: No high-grade canal or neural foraminal stenosis.     PARASPINAL: No extraspinal abnormality.     THORACIC SPINE CT:  VERTEBRA: Diffuse osteopenia. Thoracic dextrocurvature. Exaggerated thoracic kyphosis due to chronic T6, T7, and T8 compression fractures. Additional chronic T11 compression fracture is noted. No new or acute fracture. No posttraumatic subluxation.   Multilevel spondylosis.     CANAL/FORAMINA: No high-grade canal or neural foraminal stenosis.     PARASPINAL: Numerous calcified splenic granulomas and calcified hilar/mediastinal lymph nodes, suggestive of remote healed granulomatous disease. Aortic atherosclerotic calcifications.     LUMBAR SPINE CT:  VERTEBRA: Diffuse osteopenia. Levocurvature. Somewhat straightened lumbar  lordosis without significant spondylolisthesis. Chronic L1 compression fracture. Acute L2 compression fracture with approximately 40% height loss. Small cortical defect through the   superior endplate and sclerosis paralleling the superior endplate. 5 mm  retropulsion of fracture fragments into the canal. L4-L5 posterior decompression. Multilevel spondylosis.     CANAL/FORAMINA: Suspected severe left neural foraminal narrowing at L4-L5. Otherwise, no high-grade neural foraminal narrowing. Mild canal stenosis at L2 due to retropulsion of fracture fragments. Suspected moderate to severe L2-L3 canal stenosis.     PARASPINAL: Large nonobstructing right renal stone.                                                                      IMPRESSION:  CERVICAL SPINE CT:  1. No fracture or posttraumatic subluxation.     THORACIC SPINE CT:  1. No acute fracture or posttraumatic subluxation.  2. Chronic T6, T7, T8, and T11 compression fractures     LUMBAR SPINE CT:  1.  Acute L2 compression fracture with 40% height loss and 5 mm retropulsion of fracture fragments into the canal. This results in mild canal stenosis.  2.  Chronic L1 compression fracture.  3.  Severe left neural foraminal narrowing at L4-L5.  4.  Moderate to severe canal stenosis at L2-L3.       Plan:   Due to multiple falls, generalized weakness, and inability to mobilize, plan for admission via hospitalist for pain control. Lumbar MRI for further evaluation of lumbar fracture with retropulsion. Neurosurgery will seen in consultation in AM.     Discussed with Dr. Espinosa who was in agreement with the plan.      Jocelynn Lima, [facility] Neurosurgery  [address] [place] [id number]  Tel [phone]  Fax [phone]  Text page via [facility] Paging/Directory

## 2024-08-26 NOTE — PROGRESS NOTES
Orthotics:  S: patient was seen in [facility] room 411 for measurements for a custom TLSO.  O: The patient was mobile and able to log roll well for brace measurements  A/G: The patient was measured for a custom TLSO in the supine position for her L2 fracture.  The goal of the brace is to limit motion of the patient's spine to allow for healing.  P: The plan is to have the brace fabricated and fit tomorrow.    CAMRYN Felipe LO, Board Eligible Prosthetist.

## 2024-08-26 NOTE — CONSULTS
Neurosurgery Consult    HPI    Ms. Winter is an 84-year-old female who fell about a week ago while she was walking on a stone path by her house, she is unclear exactly how she fell.  She reports continued significant back pain since that time.  She has mostly been in bed but has been able to get up and out to go to the bathroom and eat.  She was admitted today she is in bed and reports continued back pain without any radicular symptoms.  She denies any bowel or bladder symptoms or saddle anesthesia.  She underwent a lumbar MRI overnight which demonstrated burst fracture of L2 with retropulsion, and severe spinal canal stenosis at L2-3.  yesterday after having another fall when trying to get up.    Patient denies symptoms of neurogenic claudication at baseline.  Denies radicular symptoms at this time.      Medical history  recurrent falls, multiple myeloma, hyperlipidemia, and polyneuropathy    Social history  Retired nurse, used to work at [employer], currently lives at home.      B/P: 115/56, T: 98.3, P: 60, R: 20       Exam    Alert, oriented, no acute distress, sitting upright in bed  Lumbar spine tender to palpation in the region of L2, nontender in the thoracic spine, or lower lumbar spine..    Bilateral lower extremities with 5 /5 strength, with hip flexion, knee extension, ankle dorsiflexion plantarflexion  Sensation intact bilateral lower extremities  Reflexes absent patella      Imaging    Lumbar MRI demonstrated                                                                     IMPRESSION:  1.  L2 acute burst fracture with retropulsion of vertebral body elements resulting in mild spinal canal stenosis.  2.  Increased severe L2-L3 spinal canal stenosis.  3.  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    Assessment    Status post multiple falls  L2 burst fracture with retropulsion, severe spinal stenosis at L2-3    Plan:      We do recommend the patient obtain a custom  TLSO brace due to her burst fracture at L2, to prevent worsening.    She should remain on bedrest until her brace is fitted and delivered.    We will obtain upright lumbar x-rays once she has her brace.

## 2024-08-26 NOTE — PROGRESS NOTES
[facility]    Medicine Progress Note - Hospitalist Service    Date of Admission:  8/25/2024    Assessment & Plan   Jacquelin Sampson is 84 year old female with history of recurrent falls, multiple myeloma, hyperlipidemia, and polyneuropathy admitted on 8/25/2024 with recurrent falls, occipital laceration and acute L2 compression fracture.     Neurosurgery service recommends lumbar MRI.     Acute L2 fracture  Recurrent falls  Severe L2-L3 stenosis  Recurrent falls is possibly secondary to deconditioning from severe DJD.  Denies syncopal event, seizures or loss of consciousness.  -Spinal CT revealed chronic T6, T7, T8, and T11 compression fractures, acute L2 compression fracture with 40% height loss and 5 mm retropulsion of fracture fragments into the canal, chronic L1 compression fracture, severe left neural foraminal narrowing at L4-L5 and moderate to severe canal stenosis at L2-L3.    -MRI L spine with L2 acute burst fracture with retropulsion of vertebral body resulting in mild spinal canal stenosis. Inc severe L2-L3 spinal canal stenosis  -Analgesics as needed  -Fall precaution  -Seen by Neurosurgery, appreciate recs  -custom TLSO brace, bedrest until then  -Upright Lumbar xrays with brace     Occipital laceration  Wound care as per wound care nurse     Multiple myeloma  Pancytopenia  PET scan on 4/2/2024 revealed no evidence of relapse or progression of multiple myeloma and no significant change compared to PET scan performed on 3/23/2022 PET/CT.  Is on Lenalidomide (off 1 week since 08/25) and Weekly Decadron at home  Patient [facility] up at [facility]     Polyneuropathy  PTA pregabalin     Hypertension  PTA metoprolol succinate 50 mg daily     Hyperlipidemia  PTA simvastatin          Diet: Combination Diet Low Saturated Fat Na <2400mg Diet, No Caffeine Diet    DVT Prophylaxis: Pneumatic Compression Devices  Parkinson Catheter: Not present  Lines: None     Cardiac Monitoring: None  Code Status:  Full Code      Clinically Significant Risk Factors Present on Admission                # Drug Induced Platelet Defect: home medication list includes an antiplatelet medication      # Anemia: based on hgb <11           # Financial/Environmental Concerns: none               Disposition Plan     Medically Ready for Discharge: Anticipated Tomorrow             Donna Esquivel MD  Hospitalist Service  [facility]  Securely message with [employer] (more info)  Text page via [employer] Paging/Directory   ______________________________________________________________________    Interval History   Patient was examined at the bedside, new to me today. Pain is moderately controlled.  Custom TLSO brace to be delivered tomorrow    Physical Exam   Vital Signs: Temp: 98.3  F (36.8  C) Temp src: Oral BP: 115/56 Pulse: 60   Resp: 20 SpO2: 96 % O2 Device: None (Room air)    Weight: 116 lbs 0 oz    General appearance: Awake, Alert, Cooperative, not in any obvious distress and appears stated age   Lungs: Clear to auscultation bilaterally, no wheezing, good air exchange, normal work of breathing  Cardiovascular: Regular Rate and Rythm, normal apical impulse, normal S1 and S2, no lower extremity edema bilaterally  Abdomen: Soft, non-tender and Non-distended, active bowel sounds  Skin: 3 to 4 cm laceration in the occipital region.  Musculoskeletal: No bony deformities or joint tenderness. Normal ROM upon flexion & extension.   Neurologic: Alert & Oriented X 3, Facial symmetry preserved and upper & lower extremities moving well with symmetry    Medical Decision Making       50 MINUTES SPENT BY ME on the date of service doing chart review, history, exam, documentation & further activities per the note.      Data     I have personally reviewed the following data over the past 24 hrs:    3.1 (L)  \   8.7 (L)   / 148 (L)     139 100 22.4 /  137 (H)   3.5 25 0.87 \       Imaging results reviewed over the past 24 hrs:   Recent  Results (from the past 24 hour(s))   CT Head w/o Contrast    Narrative    EXAM: CT HEAD W/O CONTRAST  LOCATION: [facility]  DATE: 8/25/2024    INDICATION: trauma  COMPARISON: 9/19/2023.  TECHNIQUE: Routine CT Head without IV contrast. Multiplanar reformats. Dose reduction techniques were used.    FINDINGS:  INTRACRANIAL CONTENTS: No intracranial hemorrhage, extraaxial collection, or mass effect.  No CT evidence of acute infarct. Chronic right thalamic lacunar infarct. Advanced presumed chronic small vessel ischemic changes. Moderate generalized volume loss.   No hydrocephalus.     VISUALIZED ORBITS/SINUSES/MASTOIDS: No intraorbital abnormality. No paranasal sinus mucosal disease. No middle ear or mastoid effusion.    BONES/SOFT TISSUES: No acute abnormality.      Impression    IMPRESSION:  1.  No CT evidence for acute intracranial process.  2.  Brain atrophy and presumed chronic microvascular ischemic changes as above.   CT Cervical Spine w/o Contrast    Narrative    EXAM: CT CERVICAL SPINE W/O CONTRAST, CT LUMBAR SPINE W/O CONTRAST, CT THORACIC SPINE W/O CONTRAST  LOCATION: [facility]  DATE: 8/25/2024    INDICATION: trauma  COMPARISON: 7/26/2024 radiographs.  TECHNIQUE:  1) Routine CT Cervical Spine without IV contrast. Multiplanar reformats. Dose reduction techniques were used.   2) Routine CT Thoracic Spine without IV contrast. Multiplanar reformats. Dose reduction techniques were used.   3) Routine CT Lumbar Spine without IV contrast. Multiplanar reformats. Dose reduction techniques were used.     FINDINGS:    CERVICAL SPINE CT:  VERTEBRA: Diffuse osteopenia. Normal vertebral body heights and alignment. No fracture or posttraumatic subluxation. Multilevel degenerative disc disease, greatest at C4-C5, C5-C6, and C6-C7.    CANAL/FORAMINA: No high-grade canal or neural foraminal stenosis.    PARASPINAL: No extraspinal abnormality.    THORACIC SPINE CT:  VERTEBRA:  Diffuse osteopenia. Thoracic dextrocurvature. Exaggerated thoracic kyphosis due to chronic T6, T7, and T8 compression fractures. Additional chronic T11 compression fracture is noted. No new or acute fracture. No posttraumatic subluxation.   Multilevel spondylosis.    CANAL/FORAMINA: No high-grade canal or neural foraminal stenosis.    PARASPINAL: Numerous calcified splenic granulomas and calcified hilar/mediastinal lymph nodes, suggestive of remote healed granulomatous disease. Aortic atherosclerotic calcifications.    LUMBAR SPINE CT:  VERTEBRA: Diffuse osteopenia. Levocurvature. Somewhat straightened lumbar lordosis without significant spondylolisthesis. Chronic L1 compression fracture. Acute L2 compression fracture with approximately 40% height loss. Small cortical defect through the   superior endplate and sclerosis paralleling the superior endplate. 5 mm  retropulsion of fracture fragments into the canal. L4-L5 posterior decompression. Multilevel spondylosis.    CANAL/FORAMINA: Suspected severe left neural foraminal narrowing at L4-L5. Otherwise, no high-grade neural foraminal narrowing. Mild canal stenosis at L2 due to retropulsion of fracture fragments. Suspected moderate to severe L2-L3 canal stenosis.    PARASPINAL: Large nonobstructing right renal stone.      Impression    IMPRESSION:  CERVICAL SPINE CT:  1. No fracture or posttraumatic subluxation.    THORACIC SPINE CT:  1. No acute fracture or posttraumatic subluxation.  2. Chronic T6, T7, T8, and T11 compression fractures    LUMBAR SPINE CT:  1.  Acute L2 compression fracture with 40% height loss and 5 mm retropulsion of fracture fragments into the canal. This results in mild canal stenosis.  2.  Chronic L1 compression fracture.  3.  Severe left neural foraminal narrowing at L4-L5.  4.  Moderate to severe canal stenosis at L2-L3.   CT Thoracic Spine w/o Contrast    Narrative    EXAM: CT CERVICAL SPINE W/O CONTRAST, CT LUMBAR SPINE W/O CONTRAST, CT  THORACIC SPINE W/O CONTRAST  LOCATION: [facility]  DATE: 8/25/2024    INDICATION: trauma  COMPARISON: 7/26/2024 radiographs.  TECHNIQUE:  1) Routine CT Cervical Spine without IV contrast. Multiplanar reformats. Dose reduction techniques were used.   2) Routine CT Thoracic Spine without IV contrast. Multiplanar reformats. Dose reduction techniques were used.   3) Routine CT Lumbar Spine without IV contrast. Multiplanar reformats. Dose reduction techniques were used.     FINDINGS:    CERVICAL SPINE CT:  VERTEBRA: Diffuse osteopenia. Normal vertebral body heights and alignment. No fracture or posttraumatic subluxation. Multilevel degenerative disc disease, greatest at C4-C5, C5-C6, and C6-C7.    CANAL/FORAMINA: No high-grade canal or neural foraminal stenosis.    PARASPINAL: No extraspinal abnormality.    THORACIC SPINE CT:  VERTEBRA: Diffuse osteopenia. Thoracic dextrocurvature. Exaggerated thoracic kyphosis due to chronic T6, T7, and T8 compression fractures. Additional chronic T11 compression fracture is noted. No new or acute fracture. No posttraumatic subluxation.   Multilevel spondylosis.    CANAL/FORAMINA: No high-grade canal or neural foraminal stenosis.    PARASPINAL: Numerous calcified splenic granulomas and calcified hilar/mediastinal lymph nodes, suggestive of remote healed granulomatous disease. Aortic atherosclerotic calcifications.    LUMBAR SPINE CT:  VERTEBRA: Diffuse osteopenia. Levocurvature. Somewhat straightened lumbar lordosis without significant spondylolisthesis. Chronic L1 compression fracture. Acute L2 compression fracture with approximately 40% height loss. Small cortical defect through the   superior endplate and sclerosis paralleling the superior endplate. 5 mm  retropulsion of fracture fragments into the canal. L4-L5 posterior decompression. Multilevel spondylosis.    CANAL/FORAMINA: Suspected severe left neural foraminal narrowing at L4-L5. Otherwise, no  high-grade neural foraminal narrowing. Mild canal stenosis at L2 due to retropulsion of fracture fragments. Suspected moderate to severe L2-L3 canal stenosis.    PARASPINAL: Large nonobstructing right renal stone.      Impression    IMPRESSION:  CERVICAL SPINE CT:  1. No fracture or posttraumatic subluxation.    THORACIC SPINE CT:  1. No acute fracture or posttraumatic subluxation.  2. Chronic T6, T7, T8, and T11 compression fractures    LUMBAR SPINE CT:  1.  Acute L2 compression fracture with 40% height loss and 5 mm retropulsion of fracture fragments into the canal. This results in mild canal stenosis.  2.  Chronic L1 compression fracture.  3.  Severe left neural foraminal narrowing at L4-L5.  4.  Moderate to severe canal stenosis at L2-L3.   CT Lumbar Spine w/o Contrast    Narrative    EXAM: CT CERVICAL SPINE W/O CONTRAST, CT LUMBAR SPINE W/O CONTRAST, CT THORACIC SPINE W/O CONTRAST  LOCATION: [facility]  DATE: 8/25/2024    INDICATION: trauma  COMPARISON: 7/26/2024 radiographs.  TECHNIQUE:  1) Routine CT Cervical Spine without IV contrast. Multiplanar reformats. Dose reduction techniques were used.   2) Routine CT Thoracic Spine without IV contrast. Multiplanar reformats. Dose reduction techniques were used.   3) Routine CT Lumbar Spine without IV contrast. Multiplanar reformats. Dose reduction techniques were used.     FINDINGS:    CERVICAL SPINE CT:  VERTEBRA: Diffuse osteopenia. Normal vertebral body heights and alignment. No fracture or posttraumatic subluxation. Multilevel degenerative disc disease, greatest at C4-C5, C5-C6, and C6-C7.    CANAL/FORAMINA: No high-grade canal or neural foraminal stenosis.    PARASPINAL: No extraspinal abnormality.    THORACIC SPINE CT:  VERTEBRA: Diffuse osteopenia. Thoracic dextrocurvature. Exaggerated thoracic kyphosis due to chronic T6, T7, and T8 compression fractures. Additional chronic T11 compression fracture is noted. No new or acute fracture.  No posttraumatic subluxation.   Multilevel spondylosis.    CANAL/FORAMINA: No high-grade canal or neural foraminal stenosis.    PARASPINAL: Numerous calcified splenic granulomas and calcified hilar/mediastinal lymph nodes, suggestive of remote healed granulomatous disease. Aortic atherosclerotic calcifications.    LUMBAR SPINE CT:  VERTEBRA: Diffuse osteopenia. Levocurvature. Somewhat straightened lumbar lordosis without significant spondylolisthesis. Chronic L1 compression fracture. Acute L2 compression fracture with approximately 40% height loss. Small cortical defect through the   superior endplate and sclerosis paralleling the superior endplate. 5 mm  retropulsion of fracture fragments into the canal. L4-L5 posterior decompression. Multilevel spondylosis.    CANAL/FORAMINA: Suspected severe left neural foraminal narrowing at L4-L5. Otherwise, no high-grade neural foraminal narrowing. Mild canal stenosis at L2 due to retropulsion of fracture fragments. Suspected moderate to severe L2-L3 canal stenosis.    PARASPINAL: Large nonobstructing right renal stone.      Impression    IMPRESSION:  CERVICAL SPINE CT:  1. No fracture or posttraumatic subluxation.    THORACIC SPINE CT:  1. No acute fracture or posttraumatic subluxation.  2. Chronic T6, T7, T8, and T11 compression fractures    LUMBAR SPINE CT:  1.  Acute L2 compression fracture with 40% height loss and 5 mm retropulsion of fracture fragments into the canal. This results in mild canal stenosis.  2.  Chronic L1 compression fracture.  3.  Severe left neural foraminal narrowing at L4-L5.  4.  Moderate to severe canal stenosis at L2-L3.   Lumbar spine MRI w/o contrast    Narrative    EXAM: MR LUMBAR SPINE W/O CONTRAST  LOCATION: [facility]  DATE: 8/25/2024    INDICATION: fall, L2 fracture  COMPARISON: CT lumbar spine August 25, 2024. MR lumbar spine July 11, 2021.  TECHNIQUE: Routine Lumbar Spine MRI without IV contrast.    FINDINGS:    Again seen is a L2 burst fracture with mild associated edema and retropulsion of vertebral body elements and spinal canal by 5 mm. In combination with facet arthrosis this retropulsion results in mild spinal canal stenosis. Stable chronic T11 and L1   compression fractures.    Rotary levoscoliosis. Sagittal alignment is maintained. No additional aggressive bone marrow signal. Conus is normal signal terminating at the level of L1-L2. Clumping of the lower lumbar nerve roots in keeping with arachnoiditis. Extraspinal structures   are    T12-L1: Mild disc degeneration. Small diffuse disc bulge eccentric to the right. Mild facet arthrosis. No spinal canal stenosis. No neural foraminal stenosis. Unchanged.     L1-L2: Mild disc degeneration. Small bilobed disc bulge. Mild facet arthrosis with prominent ligament flavum thickening. In combination with retropulsion of L2 vertebral body elements this there is mild spinal canal stenosis. Unchanged mild bilateral   neural foraminal stenosis.    L2-L3: Large (6 mm AP) diffuse disc bulge with severe facet arthrosis and ligamentum flavum thickening resulting in increased now severe spinal canal stenosis. Moderate to severe right and mild left neural foraminal stenosis. Unchanged.     L3-L4: Severe disc degeneration. Small diffuse disc bulge osteophyte moderate facet arthrosis. Mild to moderate bilateral neural foraminal stenosis. Unchanged.    L4-L5: Laminectomy. Moderate disc degeneration. No spinal canal stenosis. Severe left and mild right facet arthrosis. Mild to moderate bilateral neural foraminal stenosis. Unchanged.    L5-S1: Moderate disc degeneration. Moderate diffuse disc bulge osteophyte eccentric to the left, extending into the inferior left neural foramen. Mild facet arthrosis. No spinal canal stenosis. Moderate left and mild right neural foraminal stenosis.   Unchanged.      Impression    IMPRESSION:  1.  L2 acute burst fracture with retropulsion of vertebral body  elements resulting in mild spinal canal stenosis.  2.  Increased severe L2-L3 spinal canal stenosis.  3.  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

## 2024-08-26 NOTE — PLAN OF CARE
PRIMARY DIAGNOSIS: ACUTE PAIN  OUTPATIENT/OBSERVATION GOALS TO BE MET BEFORE DISCHARGE:  1. Pain Status: Improved-controlled with oral pain medications.    2. Return to near baseline physical activity: No    3. Cleared for discharge by consultants (if involved): No    Discharge Planner Nurse   Safe discharge environment identified: No  Barriers to discharge: Yes       Entered by: Nader Vale RN 08/26/2024 9:24 AM     Please review provider order for any additional goals.   Nurse to notify provider when observation goals have been met and patient is ready for discharge.Goal Outcome Evaluation:

## 2024-08-27 NOTE — PROGRESS NOTES
"Care Management Follow Up    Length of Stay (days): 1    Expected Discharge Date: 08/28/2024     Concerns to be Addressed: Care progression - discharge planning     Patient plan of care discussed at interdisciplinary rounds: Yes    Anticipated Discharge Disposition:  TBD     Anticipated Discharge Services:  TBD  Anticipated Discharge DME:  NA    Patient/family educated on Medicare website which has current facility and service quality ratings:  NA  Education Provided on the Discharge Plan:  Yes per team  Patient/Family in Agreement with the Plan:  NA    Referrals Placed by CM/SW:  DAVID  Private pay costs discussed: Not applicable    Additional Information:  Rec'd a page to see patient's family in the room.    Met with patient's spouse, Foreign, at bedside. Foreign has a lot of questions about patient's cares, food, fluids and treatment plans. Foreign states, \"She has cancer and is on a very strong cancer medication. She should be getting a lot of fluids.\" Foreign would like to talk with the doctor.    Message sent to Dr. Esquivel.    Social Hx: \"Live w/spouse in a house. Independent w/ADLs/IADLs. Walk with cane. No community resources and no other DME. Goal is to return home. Aniticipate family will transport.\"    RNCM to follow for medical progression, recommendations, and final discharge plan.     Vanda Loera RN     Per provider, Dr. Esquivel, no discharge. Waiting for TLSO brace. Once patient receives the brace, then PT can eval and work with patient.  "

## 2024-08-27 NOTE — PLAN OF CARE
Problem: Adult Inpatient Plan of Care  Goal: Plan of Care Review  Description: The Plan of Care Review/Shift note should be completed every shift.  The Outcome Evaluation is a brief statement about your assessment that the patient is improving, declining, or no change.  This information will be displayed automatically on your shift  note.  Outcome: Progressing   Goal Outcome Evaluation:       Pt having some nausea and acid reflux. Pt started on iv fluids and iv PPI. Pt also with low K+ and mag, started on tele and protocols. Pt has brace and will have xray.

## 2024-08-27 NOTE — PROGRESS NOTES
S: Patient was seen in [facility] room 411 for F/D of a custom TLSO.  O: The patient was mobile and able to log roll well for brace fitting.  A/G: Today I F/D a custom TLSO in the supine position for her L2 burst fracture. The goal of the brace is to limit motion of the patient's spine to allow for healing. Fit is good. Donning doffing wear and care instructions given.   P: Pt. to be seen as needed.    Lonny COWAN, LO

## 2024-08-27 NOTE — PLAN OF CARE
Problem: Adult Inpatient Plan of Care  Goal: Plan of Care Review  Description: The Plan of Care Review/Shift note should be completed every shift.  The Outcome Evaluation is a brief statement about your assessment that the patient is improving, declining, or no change.  This information will be displayed automatically on your shift  note.  Outcome: Progressing  Goal: Absence of Hospital-Acquired Illness or Injury  Intervention: Identify and Manage Fall Risk  Recent Flowsheet Documentation  Taken 8/27/2024 0030 by Gina Sims RN  Safety Promotion/Fall Prevention:   clutter free environment maintained   increased rounding and observation   increase visualization of patient   safety round/check completed  Taken 8/26/2024 2000 by Gina Sims RN  Safety Promotion/Fall Prevention:   clutter free environment maintained   increased rounding and observation   increase visualization of patient   safety round/check completed  Intervention: Prevent Skin Injury  Recent Flowsheet Documentation  Taken 8/27/2024 0030 by Gina Sims RN  Body Position: position changed independently  Taken 8/26/2024 2000 by Gina Sims RN  Body Position: position changed independently  Goal: Optimal Comfort and Wellbeing  Intervention: Monitor Pain and Promote Comfort  Recent Flowsheet Documentation  Taken 8/26/2024 2000 by Gina Sims RN  Pain Management Interventions: medication offered but refused     Problem: Pain Acute  Goal: Optimal Pain Control and Function  Outcome: Progressing  Intervention: Develop Pain Management Plan  Recent Flowsheet Documentation  Taken 8/26/2024 2000 by Gina Sims RN  Pain Management Interventions: medication offered but refused     Problem: Comorbidity Management  Goal: Blood Pressure in Desired Range  Outcome: Progressing   Goal Outcome Evaluation:       Pt alert & oriented with intermittent confusion and forgetful. Feeling nauseous, prn  IV compazine given with relief. Refused oral meds due to something stuck in throat. On room air, Vital Signs stable. Will continue to monitor.

## 2024-08-27 NOTE — PROGRESS NOTES
Neurosurgery progress note    Patient states she continues to have back pain.  Awaiting brace to be delivered today.    Exam    Alert and in no acute distress  Moving bilateral lower extremities appropriate strength  Lying in bed    Assessment    Status post multiple falls  L2 burst fracture with retropulsion, severe spinal stenosis at L2-3    Plan    Once patient's brace arrives and is fitted she can go down for an upright lumbar x-ray, and pending review of imaging, patient can be activity as tolerated with her brace on when out of bed, and we will plan to follow-up with her in 3 to 4 weeks in neurosurgery clinic.    Will follow-up later today after x-rays are completed.

## 2024-08-28 NOTE — PLAN OF CARE
Problem: Adult Inpatient Plan of Care  Goal: Plan of Care Review  Description: The Plan of Care Review/Shift note should be completed every shift.  The Outcome Evaluation is a brief statement about your assessment that the patient is improving, declining, or no change.  This information will be displayed automatically on your shift  note.  Outcome: Progressing  Goal: Absence of Hospital-Acquired Illness or Injury  Intervention: Identify and Manage Fall Risk  Recent Flowsheet Documentation  Taken 8/28/2024 0052 by Gina Sims RN  Safety Promotion/Fall Prevention:   clutter free environment maintained   increased rounding and observation   increase visualization of patient   safety round/check completed  Intervention: Prevent Skin Injury  Recent Flowsheet Documentation  Taken 8/28/2024 0052 by Gina Sims RN  Body Position: position changed independently  Goal: Optimal Comfort and Wellbeing  Intervention: Monitor Pain and Promote Comfort  Recent Flowsheet Documentation  Taken 8/27/2024 2047 by Gnia Sims RN  Pain Management Interventions: medication (see MAR)     Problem: Pain Acute  Goal: Optimal Pain Control and Function  Outcome: Progressing  Intervention: Develop Pain Management Plan  Recent Flowsheet Documentation  Taken 8/27/2024 2047 by Gina Sims RN  Pain Management Interventions: medication (see MAR)     Problem: Comorbidity Management  Goal: Blood Pressure in Desired Range  Outcome: Progressing  Goal: Maintenance of Heart Failure Symptom Control  Outcome: Progressing   Goal Outcome Evaluation:       Pt alert & oriented, forgetful. Prn IV dilaudid for pain. TLSO brace on at all times. Critical potassium lab result, MD notified, replaced. Telemetry showing A. Fib. IVF LR infusing well at 100ml/hr. On room air, Vital Signs stable. Will continue to monitor.

## 2024-08-28 NOTE — PROGRESS NOTES
[facility]    Medicine Progress Note - Hospitalist Service    Date of Admission:  8/25/2024    Assessment & Plan   Jacquelin Sampson is 84 year old female with history of recurrent falls, multiple myeloma, hyperlipidemia, and polyneuropathy admitted on 8/25/2024 with recurrent falls, occipital laceration and acute L2 compression fracture.     Neurosurgery service recommends lumbar MRI.     Acute L2 fracture  Recurrent falls  Severe L2-L3 stenosis  -Recurrent falls is possibly secondary to deconditioning from severe DJD.  Denies syncopal event, seizures or loss of consciousness.  -Spinal CT revealed chronic T6, T7, T8, and T11 compression fractures, acute L2 compression fracture with 40% height loss and 5 mm retropulsion of fracture fragments into the canal, chronic L1 compression fracture, severe left neural foraminal narrowing at L4-L5 and moderate to severe canal stenosis at L2-L3.    -MRI L spine with L2 acute burst fracture with retropulsion of vertebral body resulting in mild spinal canal stenosis. Inc severe L2-L3 spinal canal stenosis  -Analgesics as needed  -Fall precaution  -Seen by Neurosurgery, appreciate recs  -custom TLSO brace provided, bedrest until then  -Upright Lumbar xrays with brace pending  -pending PT/OT eval with brace    Severe Hypokalemia  Hypomagnesemia  Cardiac monitoring  Monitor and replete as needed    Hypocalcemia  Check Ionized calcium    Anion gap metabolic acidosis  Likely due to starvation ketoacidosis  Due to poor po intake  Antiemetics and encourage po intake  IV hydration  CXR, UA r/o infection  Monitor bmp am    Atrial fibrillation  EKG with new onset A.fib  Rate controlled  Chadsvasc score 4  On Metoprolol 50 mg  Will discuss with patient and family regarding anticoagulation due to patient having recurrent falls  Cardiac monitoring     Occipital laceration  Wound care as per wound care nurse     Multiple myeloma  Pancytopenia  PET scan on 4/2/2024  revealed no evidence of relapse or progression of multiple myeloma and no significant change compared to PET scan performed on 3/23/2022 PET/CT.  Is on Lenalidomide (off 1 week since 08/25) and Weekly Decadron at home  Patient follows up at [facility]     Polyneuropathy  PTA pregabalin     Hypertension  PTA metoprolol succinate 50 mg daily     Hyperlipidemia  PTA simvastatin          Diet: Combination Diet Low Saturated Fat Na <2400mg Diet, No Caffeine Diet    DVT Prophylaxis: Pneumatic Compression Devices  Parkinson Catheter: Not present  Lines: None     Cardiac Monitoring: ACTIVE order. Indication: Electrolyte Imbalance (24 hours)- Magnesium <1.3 mg/ml; Potassium < =2.8 or > 5.5 mg/ml  Code Status: Full Code      Clinically Significant Risk Factors        # Hypokalemia: Lowest K = 2.4 mmol/L in last 2 days, will replace as needed     # Hypomagnesemia: Lowest Mg = 1.5 mg/dL in last 2 days, will replace as needed                     # Financial/Environmental Concerns: none               Disposition Plan     Medically Ready for Discharge: Anticipated Tomorrow             Donna Esquivel MD  Hospitalist Service  [facility]  Securely message with [employer] (more info)  Text page via [facility] Paging/Directory   ______________________________________________________________________    Interval History   Patient was examined at the bedside, states she does not feel well. Has nausea, unable to tolerate po intake and has pain in the back   and daughters present at the bedside  Started on IV fluids and IV dilaudid for pain  Advised strict bedrest until she wears the brace and Xray reviewed    Physical Exam   Vital Signs: Temp: 97.9  F (36.6  C) Temp src: Oral BP: 120/69 Pulse: 63   Resp: 18 SpO2: 96 % O2 Device: None (Room air)    Weight: 116 lbs 0 oz    General appearance: Awake, Alert, Cooperative, mild distress and appears stated age   Lungs: Clear to auscultation bilaterally, no wheezing, good air  exchange, normal work of breathing  Cardiovascular: Regular Rate and Rythm, normal apical impulse, normal S1 and S2, no lower extremity edema bilaterally  Abdomen: Soft, non-tender and Non-distended, active bowel sounds  Skin: 3 to 4 cm laceration in the occipital region.  Musculoskeletal: No bony deformities or joint tenderness. Normal ROM upon flexion & extension.   Neurologic: Alert & Oriented X 3, Facial symmetry preserved and upper & lower extremities moving well with symmetry    Medical Decision Making       55 MINUTES SPENT BY ME on the date of service doing chart review, history, exam, documentation & further activities per the note.      Data     I have personally reviewed the following data over the past 24 hrs:    3.9 (L)  \   10.3 (L)   / 149 (L)     142 100 14.9 /  113 (H)   2.4 (LL) 25 0.84 \       Imaging results reviewed over the past 24 hrs:   No results found for this or any previous visit (from the past 24 hour(s)).   Stage 2: Additional Anesthesia Type: 1% lidocaine with epinephrine

## 2024-08-28 NOTE — PROGRESS NOTES
Physical Therapy        08/28/24 1400   Appointment Info   Signing Clinician's Name / Credentials (PT) Meri Perez DPT   Living Environment   People in Home spouse   Current Living Arrangements house   Home Accessibility stairs within home   Number of Stairs, Within Home, Primary six   Stair Railings, Within Home, Primary railings safe and in good condition   Transportation Anticipated family or friend will provide   Self-Care   Fall history within last six months yes   Activity/Exercise/Self-Care Comment Per pt, she owns a FWW but only uses straight cane. IND ADLs at BL. independent with ADLs   General Information   Onset of Illness/Injury or Date of Surgery 08/25/24   Referring Physician Pablito Garcia MD   Patient/Family Therapy Goals Statement (PT) go home   Pertinent History of Current Problem (include personal factors and/or comorbidities that impact the POC) 84 year old female with history of recurrent falls, multiple myeloma, hyperlipidemia, and polyneuropathy admitted on 8/25/2024 with recurrent falls, occipital laceration and acute L2 compression fracture.   Existing Precautions/Restrictions brace worn when out of bed;spinal   Cognition   Cognitive Status Comments pt grossly oriented, flat affect, follows most instructions   Pain Assessment   Patient Currently in Pain Yes, see Vital Sign flowsheet   Integumentary/Edema   Integumentary/Edema no deficits were identifed   Posture    Posture Forward head position   Range of Motion (ROM)   Range of Motion ROM deficits secondary to pain   Strength (Manual Muscle Testing)   Strength (Manual Muscle Testing) Deficits observed during functional mobility   Bed Mobility   Comment, (Bed Mobility) pt in bed with HOB fully elevated upon PT's arrival. Sat up at EOB independently from there.   Transfers   Comment, (Transfers) CGA with FWW, unsteady   Gait/Stairs (Locomotion)   Comment, (Gait/Stairs) 10' with FWW, CGA, decreased step length and path deviation    Sensory Examination   Sensory Perception patient reports no sensory changes   Clinical Impression   Criteria for Skilled Therapeutic Intervention Yes, treatment indicated   PT Diagnosis (PT) gait instability   Influenced by the following impairments pain, weakness, orthopedic restriction   Functional limitations due to impairments limited household mobility   Clinical Presentation (PT Evaluation Complexity) stable   Clinical Presentation Rationale presents as medically diagnosed   Clinical Decision Making (Complexity) moderate complexity   Planned Therapy Interventions (PT) balance training;bed mobility training;gait training;neuromuscular re-education;orthotic fitting/training;patient/family education;stair training;strengthening;stretching;transfer training;progressive activity/exercise   Risk & Benefits of therapy have been explained evaluation/treatment results reviewed;care plan/treatment goals reviewed;participants voiced agreement with care plan;participants included;patient   PT Total Evaluation Time   PT Eval, Moderate Complexity Minutes (54547) 10   Physical Therapy Goals   PT Frequency Daily   PT Predicted Duration/Target Date for Goal Attainment 09/04/24   PT Goals Bed Mobility;Transfers;Gait;Stairs   PT: Bed Mobility Supervision/stand-by assist;Supine to/from sit;Within precautions   PT: Transfers Supervision/stand-by assist;Sit to/from stand;Within precautions   PT: Gait Supervision/stand-by assist;Rolling walker;150 feet;Within precautions   PT: Stairs Supervision/stand-by assist;6 stairs;Rail on both sides   Interventions   Interventions Quick Adds Therapeutic Activity;Gait Training   Therapeutic Activity   Therapeutic Activities: dynamic activities to improve functional performance Minutes (99204) 15   Symptoms Noted During/After Treatment Increased pain   Treatment Detail/Skilled Intervention pt ed on spinal precautions, TLSO wearing conditions and doffing. Pt had difficulty following  instructions for log roll technique and avoiding twisting. Able to complete supine<>sit with log roll and minAx1 once after several false starts. CGA with sit<>Stand to bed and chair, VCs for hand placement/safety   Gait Training   Gait Training Minutes (04030) 10   Symptoms Noted During/After Treatment (Gait Training) increased pain   Treatment Detail/Skilled Intervention Cues for spinal precautions and posture. pt leaning heavily on walker. pt ed on nonreciprocal technique on stairs with both hands on rail for safety and spinal precautions   Distance in Feet 200'x2, 8 stairs   Traverse Level (Gait Training) contact guard   Physical Assistance Level (Gait Training) 1 person assist   Assistive Device (Gait Training) rolling walker   PT Discharge Planning   PT Plan log roll / spinal precautions, educate family on donning/doffing of TLSO, GT with FWW stairs   PT Discharge Recommendation (DC Rec) Transitional Care Facility   PT Rationale for DC Rec At this time, pt would benefit from additional time in supervised setting to improve safety w/ walker, pt needing Ax1 at this time. She states spouse can assist her, but family is concerned that he will be unable to don/doff TLSO for example.   PT Brief overview of current status amb 200' with CGA, painful   Total Session Time   Timed Code Treatment Minutes 25   Total Session Time (sum of timed and untimed services) 35       Meri Perez, DPT 8/28/2024

## 2024-08-28 NOTE — PLAN OF CARE
Problem: Adult Inpatient Plan of Care  Goal: Absence of Hospital-Acquired Illness or Injury  Intervention: Identify and Manage Fall Risk  Recent Flowsheet Documentation  Taken 8/28/2024 0800 by Tracy Vogt RN  Safety Promotion/Fall Prevention:   clutter free environment maintained   increased rounding and observation   increase visualization of patient   safety round/check completed     Problem: Orthopaedic Fracture  Goal: Optimal Functional Ability  Intervention: Optimize Functional Ability  Recent Flowsheet Documentation  Taken 8/28/2024 0800 by Tracy Vogt RN  Activity Management:   ambulated to bathroom   back to bed   Goal Outcome Evaluation:    Pt ambulating to the bathroom and back, tolerating well   Pt is experiencing intermittent nausea but improving, had bites of toast

## 2024-08-28 NOTE — PROGRESS NOTES
Neurosurgery progress note    Patient states she continues to have back pain.  Brace delivered and fitted, underwent upright x-rays yesterday which were stable.  Exam    Alert and in no acute distress  Moving bilateral lower extremities appropriate strength  Lying in bed    Assessment    Status post multiple falls  L2 burst fracture with retropulsion, severe spinal stenosis at L2-3    Plan    Patient can be activity as tolerated with her brace on when she is out of bed.  Will plan to follow-up with her in 3 to 6 weeks in neurosurgery clinic.    We will help arrange follow-up.  Neurosurgery will sign off.

## 2024-08-28 NOTE — PROGRESS NOTES
[facility]    Medicine Progress Note - Hospitalist Service    Date of Admission:  8/25/2024    Assessment & Plan   Jacquelin Sampson is 84 year old female with history of recurrent falls, multiple myeloma, hyperlipidemia, and polyneuropathy admitted on 8/25/2024 with recurrent falls, occipital laceration and acute L2 compression fracture.     Acute L2 compression fracture  Recurrent falls  Severe L2-L3 stenosis  -Recurrent falls is possibly secondary to deconditioning from severe DJD.  Denies syncopal event, seizures or loss of consciousness.  -Imaging with L2 compression fracture  -Analgesics as needed  -Fall precaution  -custom TLSO brace provided, X rays reviewed by Nsx  - Outpatient Neurosurgery follow up in 3-6 weeks  -pending PT/OT eval    Severe Hypokalemia  Hypomagnesemia  Cardiac monitoring  Monitor and replete as needed    Hypocalcemia  Ionized calcium low  S/p 1gm Calcium gluconate    Anion gap metabolic acidosis - resolved  Malnutrition  Likely due to starvation ketoacidosis, Due to poor po intake  CXR neg, procal slightly elevated, no evidence of infection  Antiemetics and encourage po intake  Stop IV fluids  Start Marinol for appetite  RD consult  UA pending     New Atrial fibrillation  EKG with new onset A.fib  Rate controlled  Chadsvasc score 4  On Metoprolol 50 mg  discussed with patient and family regarding anticoagulation due to patient having recurrent falls, decision made to hold anticoagulation  Check TSH  Cardiac monitoring     Occipital laceration  Wound care as per wound care nurse     Multiple myeloma  Pancytopenia  PET scan on 4/2/2024 revealed no evidence of relapse or progression of multiple myeloma and no significant change compared to PET scan performed on 3/23/2022 PET/CT.  Is on Lenalidomide (off 1 week since 08/25) and Weekly Decadron at home  Patient [facility] up at [facility]     Polyneuropathy  PTA pregabalin     Hypertension  PTA metoprolol succinate 50 mg  daily     Hyperlipidemia  PTA simvastatin          Diet: Regular Diet Adult    DVT Prophylaxis: Pneumatic Compression Devices  Parkinson Catheter: Not present  Lines: None     Cardiac Monitoring: ACTIVE order. Indication: Electrolyte Imbalance (24 hours)- Magnesium <1.3 mg/ml; Potassium < =2.8 or > 5.5 mg/ml  Code Status: Full Code      Clinically Significant Risk Factors        # Hypokalemia: Lowest K = 2.4 mmol/L in last 2 days, will replace as needed   # Hypocalcemia: Lowest iCa = 4.2 mg/dL in last 2 days, will monitor and replace as appropriate   # Hypomagnesemia: Lowest Mg = 1.5 mg/dL in last 2 days, will replace as needed                     # Financial/Environmental Concerns: none               Disposition Plan     Medically Ready for Discharge: Anticipated Tomorrow             Donna Esquivel MD  Hospitalist Service  [facility]  Securely message with [employer] (more info)  Text page via [facility] Paging/Directory   ______________________________________________________________________    Interval History   Patient was examined at the bedside, states nausea is a lil better today, appetite poor, will start Marinol. Discussed risks and benefits of anticoagulation for A fib, decision to hold due to recurrent falls by patient,  and son Kristian  Patient stating that she would like to go home soon    Physical Exam   Vital Signs: Temp: 98.4  F (36.9  C) Temp src: Oral BP: (!) 149/77 Pulse: 80   Resp: 16 SpO2: 99 % O2 Device: None (Room air)    Weight: 116 lbs 0 oz    General appearance: Awake, Alert, Cooperative, mild distress and appears stated age , with brace on  Lungs: Clear to auscultation bilaterally, no wheezing, good air exchange  Cardiovascular: Regular Rate and Rythm, normal apical impulse, normal S1 and S2, no lower extremity edema bilaterally  Abdomen: Soft, non-tender and Non-distended, active bowel sounds  Skin: 3 to 4 cm laceration in the occipital region.  Musculoskeletal: No bony  deformities or joint tenderness. Normal ROM upon flexion & extension.   Neurologic: Alert & Oriented X 3, Facial symmetry preserved and upper & lower extremities moving well with symmetry    Medical Decision Making       55 MINUTES SPENT BY ME on the date of service doing chart review, history, exam, documentation & further activities per the note.      Data     I have personally reviewed the following data over the past 24 hrs:    N/A  \   N/A   / N/A     140 101 19.7 /  88   3.4 26 0.84 \     Procal: 0.98 (H) CRP: N/A Lactic Acid: 0.7         Imaging results reviewed over the past 24 hrs:   Recent Results (from the past 24 hour(s))   XR Lumbar Spine 2/3 Views    Narrative    EXAM: XR LUMBAR SPINE 2/3 VIEWS  LOCATION: [facility]  DATE: 8/27/2024    INDICATION: L2 fracture.  COMPARISON: CT lumbar spine 8/25/2024.      Impression    IMPRESSION: Unchanged mild chronic superior endplate compression fracture at L1. Redemonstrated acute superior endplate burst fracture at L2 with 40% height loss, similar to prior CT and MRI.    Unchanged levoscoliosis and moderate to advanced spondylosis with degenerative anterior spondylolisthesis at L4-L5.   XR Chest 1 View    Narrative    EXAM: XR CHEST 1 VIEW  LOCATION: [facility]  DATE: 8/27/2024    INDICATION: Multiple myeloma with recurrent falls. Weakness.  COMPARISON: 6/25/2019      Impression    IMPRESSION: No hydropneumothorax or acute fracture. Heart and pulmonary vascularity are normal given projection.     Multiple, well-circumscribed small lytic lesions are seen in the proximal right humerus, presumed to reflect her myeloma.

## 2024-08-28 NOTE — PROGRESS NOTES
"Care Management Follow Up    Length of Stay (days): 2    Expected Discharge Date: 08/29/2024     Concerns to be Addressed: Care progression - discharge planning     Patient plan of care discussed at interdisciplinary rounds: Yes    Anticipated Discharge Disposition:  TBD     Anticipated Discharge Services:  TBD  Anticipated Discharge DME:  NA    Patient/family educated on Medicare website which has current facility and service quality ratings:  NA  Education Provided on the Discharge Plan:  Yes per team  Patient/Family in Agreement with the Plan:  NA    Referrals Placed by CM/SW:  NA  Private pay costs discussed: Not applicable    Additional Information:  Rec'd a page to see patient's family in the room.    Met with patient, her spouse, Foreign and son, Fernandez, at bedside. Foreign and Fernandez are concern, Foreign will not be able to care for patient.  Discussed again plans for PT/OT to eval for TCU placement now that patient has the TLSO brace.  Writer provided a list of local skilled nursing facilities [facility] (which includes the medicare.gov website) for patient and family to review.   Then will need patient and family to choose which TCU to send referrals, but this is short term.   Instructed patient and family to discuss long term care placement and options: [facility], LTC, private pay costs and home with in-home services.    Social Hx: \"Live w/spouse in a house. Independent w/ADLs/IADLs. Walk with cane. No community resources and no other DME. Goal is to return home. Aniticipate family will transport.\"    RNCM to follow for medical progression, recommendations, and final discharge plan.     Vanda Loera RN     Per provider, Dr. Esquivel, not ready for discharge. Monitoring labs and will have PT/OT eval.  "

## 2024-08-28 NOTE — PLAN OF CARE
Goal Outcome Evaluation:      Plan of Care Reviewed With: patient, family    Overall Patient Progress: improvingOverall Patient Progress: improving    Outcome Evaluation: Pain fairly well controlled with oral pain meds.  Nausea resolved with administration of antiemetics before meals.  Patient eating a few bites of toast for meals, but this is more than she had been doing at home and since admit.  Patient does not remember mobility restrictions, high fall risk, does not remember to use call light.

## 2024-08-29 PROBLEM — M81.0 AGE-RELATED OSTEOPOROSIS WITHOUT CURRENT PATHOLOGICAL FRACTURE: Status: ACTIVE | Noted: 2020-08-14

## 2024-08-29 PROBLEM — R58 ECCHYMOSIS: Status: ACTIVE | Noted: 2023-06-21

## 2024-08-29 PROBLEM — M25.552 PAIN IN LEFT HIP: Status: ACTIVE | Noted: 2017-04-13

## 2024-08-29 PROBLEM — M79.18 MUSCULOSKELETAL PAIN: Status: ACTIVE | Noted: 2023-05-11

## 2024-08-29 PROBLEM — Z96.652 PRESENCE OF LEFT ARTIFICIAL KNEE JOINT: Status: ACTIVE | Noted: 2023-05-11

## 2024-08-29 PROBLEM — Q39.4: Status: ACTIVE | Noted: 2021-05-28

## 2024-08-29 PROBLEM — Z85.828 HISTORY OF SKIN CANCER: Status: ACTIVE | Noted: 2019-11-28

## 2024-08-29 PROBLEM — M17.12 PRIMARY OSTEOARTHRITIS OF LEFT KNEE: Status: ACTIVE | Noted: 2018-11-06

## 2024-08-29 PROBLEM — R21 RASH: Status: ACTIVE | Noted: 2019-06-12

## 2024-08-29 PROBLEM — D64.9 ANEMIA: Status: ACTIVE | Noted: 2023-10-12

## 2024-08-29 PROBLEM — R63.4 ABNORMAL WEIGHT LOSS: Status: ACTIVE | Noted: 2021-09-09

## 2024-08-29 PROBLEM — D50.9 IRON DEFICIENCY ANEMIA: Status: ACTIVE | Noted: 2024-01-01

## 2024-08-29 NOTE — DISCHARGE SUMMARY
[facility]  Hospitalist Discharge Summary      Date of Admission:  8/25/2024  Date of Discharge:  8/29/2024  Discharging Provider: Maurizio Alvarado MD  Discharge Service: Hospitalist Service    Discharge Diagnoses       Jacquelin Sampson is 84 year old female with history of recurrent falls, multiple myeloma, hyperlipidemia, and polyneuropathy admitted on 8/25/2024 with recurrent falls, occipital laceration and acute L2 compression fracture.       8/29 :       Medically ready  Discharge to TCU today      A/p :       Acute L2 compression fracture  Recurrent falls  Severe L2-L3 stenosis  -Recurrent falls is possibly secondary to deconditioning from severe DJD.  Denies syncopal event, seizures or loss of consciousness.  -Imaging with L2 compression fracture  -Analgesics as needed  -Fall precaution  -custom TLSO brace provided, X rays reviewed by Nsx  - Outpatient Neurosurgery follow up in 3-6 weeks  -pending PT/OT eval     Severe Hypokalemia  Hypomagnesemia  Cardiac monitoring  Monitor and replete as needed  Discharged on potassium and magnesium tabs for a week  Magnesium and potassium levels can be monitored at TCU and supplemented as needed     Hypocalcemia  Ionized calcium low  S/p 1gm Calcium gluconate     Anion gap metabolic acidosis - resolved  Malnutrition  Likely due to starvation ketoacidosis, Due to poor po intake  CXR neg, procal slightly elevated, no evidence of infection  Antiemetics and encourage po intake  Stop IV fluids  Start Marinol for appetite  RD consult  UA : No infection  resolved     New Atrial fibrillation  EKG with new onset A.fib  Rate controlled  Chadsvasc score 4  On Metoprolol 50 mg  discussed with patient and family regarding anticoagulation due to patient having recurrent falls, decision made to hold anticoagulation  Check TSH  Cardiac monitoring     Occipital laceration  Wound care as per wound care nurse     Multiple myeloma  Pancytopenia  PET scan on 4/2/2024  revealed no evidence of relapse or progression of multiple myeloma and no significant change compared to PET scan performed on 3/23/2022 PET/CT.  Is on Lenalidomide (off 1 week since 08/25) and Weekly Decadron at home  Patient [facility]     Polyneuropathy  PTA pregabalin     Hypertension  PTA metoprolol succinate 50 mg daily     Hyperlipidemia  PTA simvastatin        Clinically Significant Risk Factors     # Severe Malnutrition: based on nutrition assessment      Follow-ups Needed After Discharge   Follow-up Appointments     Follow Up and recommended labs and tests      Follow up with [facility] physician.  The following labs/tests are   recommended: monitor potassium and magnesium.    Patient can be activity as tolerated with her brace on when she is out of   bed.    Will plan to follow-up with her in 3 to 6 weeks in neurosurgery clinic.        {Additional follow-up instructions/to-do's for PCP    : monitor k and mg levels    Unresulted Labs Ordered in the Past 30 Days of this Admission       No orders found from 7/26/2024 to 8/26/2024.        These results will be followed up by pcp    Discharge Disposition   Discharged to nursing home  Condition at discharge: Stable      Consultations This Hospital Stay   NEUROSURGERY IP CONSULT  PHYSICAL THERAPY ADULT IP CONSULT  OCCUPATIONAL THERAPY ADULT IP CONSULT  WOUND OSTOMY CONTINENCE NURSE  IP CONSULT  CARE MANAGEMENT / SOCIAL WORK IP CONSULT  NUTRITION SERVICES ADULT IP CONSULT  PHYSICAL THERAPY ADULT IP CONSULT  OCCUPATIONAL THERAPY ADULT IP CONSULT    Code Status   Full Code    Time Spent on this Encounter   I, Maurizio Alvarado MD, personally saw the patient today and spent greater than 30 minutes discharging this patient.       Maurizio Alvarado MD  [address] [address]  Phone: [phone]  Fax: [phone]  ______________________________________________________________________    Physical  Exam   Vital Signs: Temp: 98  F (36.7  C) Temp src: Oral BP: (!) 143/67 Pulse: 74   Resp: 16 SpO2: 98 % O2 Device: None (Room air)    Weight: 104 lbs 14.4 oz         GENERAL: The patient is not in any acute distressed. Awake and alert.  HEENT: Nonicteric sclerae, PERRLA, EOMI. Oropharynx clear. Moist mucous membranes. Conjunctivae appear well perfused.  HEART: Regular rate and rhythm without murmurs.  LUNGS: Clear to auscultation bilaterally. No wheezing or crackles.  ABDOMEN: Soft, positive bowel sounds, nontender.  SKIN: No rash, no excessive bruising, petechiae, or purpura.  EXTREMITIES : no rashes, no swelling in legs.  NEUROLOGIC: conscious and oriented, follows commands, no obvious focal deficits.  ROS: All other systems negative          Primary Care Physician   Vicente Hernandez    Discharge Orders      XR Lumbar Spine 2/3 Views     Potassium     Magnesium     General info for SNF    Length of Stay Estimate: Short Term Care: Estimated # of Days <30  Condition at Discharge: Stable  Level of care:skilled   Rehabilitation Potential: Fair  Admission H&P remains valid and up-to-date: Yes  Recent Chemotherapy: N/A  Use Nursing Home Standing Orders: Yes     Mantoux instructions    Give two-step Mantoux (PPD) Per Facility Policy Yes     Follow Up and recommended labs and tests    Follow up with snf physician.  The following labs/tests are recommended: monitor potassium and magnesium.    Patient can be activity as tolerated with her brace on when she is out of bed.    Will plan to follow-up with her in 3 to 6 weeks in neurosurgery clinic.     Reason for your hospital stay    Acute L2#     Activity - Up with nursing assistance     Full Code     Physical Therapy Adult Consult    Evaluate and treat as clinically indicated.    Reason:  deconditioning     Occupational Therapy Adult Consult    Evaluate and treat as clinically indicated.    Reason:  deconditioning     Diet    Follow this diet upon discharge:  Current Diet:Orders Placed This Encounter      Snacks/Supplements Adult: Special K Bar; Between Meals      Regular Diet Adult       Significant Results and Procedures   Most Recent 3 CBC's:  Recent Labs   Lab Test 08/29/24  0518 08/27/24  1248 08/25/24  1947   WBC 4.7 3.9* 3.1*   HGB 10.1* 10.3* 8.7*   MCV 96 94 95    149* 148*     Most Recent 3 BMP's:  Recent Labs   Lab Test 08/29/24  0518 08/29/24  0203 08/28/24  1846 08/28/24  1030 08/28/24  0422 08/27/24 2023 08/27/24  1248     --   --   --  140  --  142   POTASSIUM 3.8  --  3.6 3.4 3.1*  3.1*   < > 2.4*   CHLORIDE 103  --   --   --  101  --  100   CO2 25  --   --   --  26  --  25   BUN 18.5  --   --   --  19.7  --  14.9   CR 0.80  --   --   --  0.84  --  0.84   ANIONGAP 11  --   --   --  13  --  17*   ESTELA 7.9*  --   --   --  7.6*  --  7.9*   GLC 89 79  --   --  88  --  113*    < > = values in this interval not displayed.     Most Recent 2 LFT's:No lab results found.  Most Recent 3 INR's:No lab results found.    Discharge Medications   Current Discharge Medication List        START taking these medications    Details   droNABinol (MARINOL) 2.5 MG capsule Take 1 capsule (2.5 mg) by mouth 2 times daily.  Qty: 5 capsule, Refills: 0    Associated Diagnoses: Acute midline low back pain without sciatica      magnesium oxide (MAG-OX) 400 MG tablet Take 1 tablet (400 mg) by mouth daily for 7 days.    Associated Diagnoses: Acute midline low back pain without sciatica      pantoprazole (PROTONIX) 40 MG EC tablet Take 1 tablet (40 mg) by mouth every morning (before breakfast).    Associated Diagnoses: Acute midline low back pain without sciatica      potassium chloride ER (MICRO-K) 10 MEQ CR capsule Take 1 capsule (10 mEq) by mouth 2 times daily for 7 days.    Associated Diagnoses: Acute midline low back pain without sciatica      senna-docusate (SENOKOT-S/PERICOLACE) 8.6-50 MG tablet Take 1 tablet by mouth 2 times daily as needed for constipation.     Associated Diagnoses: Acute midline low back pain without sciatica      thiamine (B-1) 100 MG tablet Take 1 tablet (100 mg) by mouth daily for 10 days.    Associated Diagnoses: Acute midline low back pain without sciatica           CONTINUE these medications which have CHANGED    Details   HYDROmorphone (DILAUDID) 2 MG tablet Take 1 tablet (2 mg) by mouth every 4 hours as needed for breakthrough pain or moderate to severe pain.  Qty: 5 tablet, Refills: 0    Associated Diagnoses: Acute midline low back pain without sciatica      pregabalin (LYRICA) 50 MG capsule Take 1 capsule (50 mg) by mouth 2 times daily.  Qty: 5 capsule, Refills: 0    Associated Diagnoses: Acute midline low back pain without sciatica           CONTINUE these medications which have NOT CHANGED    Details   aspirin 81 MG EC tablet Take 81 mg by mouth daily.      calcium carbonate (OS-ESTELA) 500 MG tablet Take 1 tablet by mouth daily.      cyanocobalamin (VITAMIN B-12) 500 MCG tablet Take 500 mcg by mouth daily.      dexAMETHasone (DECADRON) 4 MG tablet Take 20 mg by mouth once a week. On Saturdays      ferrous sulfate 325 (65 FE) MG tablet Take 325 mg by mouth daily (with breakfast).      LENalidomide (REVLIMID) 10 MG CAPS capsule Take 10 mg by mouth daily For 21 days, then 1 week off      metoprolol succinate ER (TOPROL XL) 50 MG 24 hr tablet Take 50 mg by mouth daily.      multivitamin, therapeutic (THERA-VIT) TABS tablet Take 1 tablet by mouth daily.      simvastatin (ZOCOR) 10 MG tablet Take 10 mg by mouth at bedtime.           Allergies   No Known Allergies

## 2024-08-29 NOTE — CONSULTS
"CLINICAL NUTRITION SERVICES - ASSESSMENT NOTE     Nutrition Prescription    RECOMMENDATIONS FOR MDs/PROVIDERS TO ORDER:  Pt affect quite flat. Consider remeron for appetite stimulation, depression?    Malnutrition Status:    Severe in acute illness    Recommendations already ordered by Registered Dietitian (RD):  -Add thiamine 100 mg daily x 10 days d/t not meeting RDIs x 1 month  -Special k bar daily     Future/Additional Recommendations:  Adjust supplement pending intake, weight, tolerance, acceptance, weight, labs,        REASON FOR ASSESSMENT  Jaqcuelin Sampson is a/an 84 year old female assessed by the dietitian for Provider Order - malnutrition, poor po intake    Pt presents with spine fx, recurrent falls, malnutrition, new afib  Hx multiple myeloma, HTN, HLD, CAD, osteopetrosis, iron deficiency anemia, peripheral neuropathy, esophageal web, B12 deficiency    NUTRITION HISTORY  Pt lives with her spouse. Independent. Uses cane  Pt reports her appetite has been down 1 month, eating 50% of baseline d/t decreased appetite \"I am just not hungry\"    Pt fell 8/17 and was bed bound 1 week before coming into the hospital.     At baseline pt eats 2 meals a day and snacks. She does not like protein shakes      CURRENT NUTRITION ORDERS  Diet: Regular  Intake/Tolerance: Poor  Yesterday Ate approximately 125 kcal, 5 g protein over 3 meals. Only eating toast, cream of wheat and soup  8/27 Pt did not eat all day  8/26 pt ate 2 meals for the day at 599 kcal, 24 g protein    Pt reports no nausea today and ate a little more for breakfast today- toast, cherrios. Pt continues to report \"I am just not hungry\". Pt seems to have poor insight to impact of poor nutrition stating that losing weight \"is a good thing\" and not concerned about muscle loss.   Pt does not like protein shakes \"they are too rich\". Declined all supplements offered except protein bar.    at bedside with not much else to add.   Discussed addition of marrinol " "yesterday.   Pt quite flat-consider depression?     Pt is meeting <50% of needs since admit    LABS  Labs reviewed  K+ 3.8 WNL,improved from low 2.4 8/27  B12 1047 12/7/24 WNL  Vit D 33 6/25/24, WNL    MEDICATIONS  Medications reviewed  Marinol started yesterday, feso4, mvi with minerals, protonix, zocor, B12    ANTHROPOMETRICS  Height: 157.5 cm (5' 2\")  IBW: 50.1 kg  BMI: Normal BMI  Weight History: 7.1% weight loss x 1 month  Date/Time Weight Weight Method   08/29/24 1042 47.6 kg (104 lb 14.4 oz) Standing scale     Wt Readings from Last 10 Encounters:   08/25/24 07/29/24 07/11/24 06/25/24 04/02/24 12/14/23 52.6 kg (116 lb)- stated?  50.8 kg (112 lb)  - office  50.9 kg (112 lb 3.2 oz) - office  48 kg (105 lb 13.1 oz) - office  51.2 kg (112 lb 15.8 oz) - office  51.1 kg (112 lb 10.5 oz) -office   09/18/23 06/21/23 48.1 kg (106 lb)- ED  50.1 kg (110 lb 9 oz) -office   06/13/13 65.3 kg (144 lb)       Dosing Weight: 52.6 kg    ASSESSED NUTRITION NEEDS  Estimated Energy Needs: 6716-8735 kcals/day (25 - 30 kcals/kg)  Justification: Maintenance  Estimated Protein Needs: 63-79 grams protein/day (1.2 - 1.5 grams of pro/kg)  Justification: Increased needs  Estimated Fluid Needs: 8525-6342 mL/day (1 mL/kcal)   Justification: Maintenance    PHYSICAL FINDINGS  See malnutrition section below.  TLSO  1 BM yesterday  Intermittent nausea controlled with antinausea before meals per nsg yesterday. No nausea today per pt    MALNUTRITION:  % Weight Loss:  > 5% in 1 month (severe malnutrition)  % Intake:  </= 50% for >/= 1 month (severe malnutrition)  Subcutaneous Fat Loss:  Orbital region mild depletion, Upper arm region moderate depletion, and Thoracic region severe depletion  Muscle Loss:  Temporal region mild depletion, Clavicle bone region severe depletion, Acromion bone region severe depletion, and Dorsal hand region severe   depletion  Fluid Retention:  None noted  Pt notes no muscle loss.  notes \"a little\" weight " loss in her face    Malnutrition Diagnosis: Severe malnutrition  In Context of:  Chronic illness or disease    NUTRITION DIAGNOSIS  Malnutrition related to decreased appetite as evidenced by 7.1% weight loss x 1 month, decreased intake x 1 month, moderate to severe fat loss, severe muscle loss      INTERVENTIONS  Implementation  -Educated pt on nutrition needs for general health and recovery. Emphasized small frequent high protein meals. Reviewed protein foods  -Special k bar daily   - Pt quite flat, poor insight regarding poor nutrition -consider remeron for appetite and possibly depression  -Add thiamine 100 mg daily x 10 days d/t not meeting RDIs x 1 month    Goals  Meet > 50% of estimated nutrition needs  Maintain/increase weight     Monitoring/Evaluation  Progress toward goals will be monitored and evaluated per protocol.

## 2024-08-29 NOTE — PLAN OF CARE
Problem: Pain Acute  Goal: Optimal Pain Control and Function  Outcome: Progressing  Intervention: Develop Pain Management Plan  Recent Flowsheet Documentation  Taken 8/29/2024 0628 by Barbra Duran RN  Pain Management Interventions: medication (see MAR)  Taken 8/29/2024 0517 by Barbra Duran RN  Pain Management Interventions: MD notified (comment)  Taken 8/29/2024 0321 by Barbra Duran RN  Pain Management Interventions: medication (see MAR)  Intervention: Optimize Psychosocial Wellbeing  Recent Flowsheet Documentation  Taken 8/29/2024 0311 by Barbra Duran RN  Supportive Measures: active listening utilized  Taken 8/28/2024 2043 by Barbra Duran RN  Supportive Measures: active listening utilized     Problem: Orthopaedic Fracture  Goal: Bowel Elimination  Outcome: Progressing   Goal Outcome Evaluation:               Pt alert and forgetful,c/o lower back pain 7-8/10,PRN  1 mg  po dilaudid given with no effect,Tylenol PRN also given,pt continued  c/o lower back pain,2 mg dilaudid given at 0628,pt slept between cares,flat affect,VSS.

## 2024-08-29 NOTE — PROGRESS NOTES
"Care Management Follow Up    Length of Stay (days): 3    Expected Discharge Date: 08/29/2024     Concerns to be Addressed: Care progression - discharge planning     Patient plan of care discussed at interdisciplinary rounds: Yes    Anticipated Discharge Disposition:  Transitional care     Anticipated Discharge Services:  Transitional care  Anticipated Discharge DME:  NA    Patient/family educated on Medicare website which has current facility and service quality ratings:  NA  Education Provided on the Discharge Plan:  Yes per team  Patient/Family in Agreement with the Plan:  NA    Referrals Placed by CM/SW:  NA  Private pay costs discussed: Transportation costs    Additional Information:  Met with patient at bedside to discuss rehab discharge rec for transitional care and patient declined, \"I don't need to go to another facility. I can put on the brace myself. I just want to go home.\"    Social Hx: \"Live w/spouse in a house. Independent w/ADLs/IADLs. Walk with cane. No community resources and no other DME. Goal is to return home. Aniticipate family will transport.\"    RNCM to follow for medical progression, recommendations, and final discharge plan.     Vanda Loera RN     Met with patient's spouse and he said he is in no condition to care for his spouse. He will talk with patient. He selected five TCUs to send referrals:  [facility]   Jeremy Butler    Per provider, Dr. Alvarado, patient is medically ready if she has an accepting TCU.    Patient accepted to [facility]  Met with patient and family (Foreign, Ina and Lisseth) at bedside. Family selected [facility].    Message sent to Michelle at [facility], patient accept and ask what time patient can arrive?    Family said they prefer [facility]  transport.  Discussed out of pocket cost of [employer] medical transportation by " wheelchair with patient and family member, Foreign. Patient/family member agreed with the plan to have transportation arranged by [employer] transport.      Called  [facility] to set up a WC ride between [phone].    Sent message to update Dr. Jenny Montiel to inform bedside nurseCarolina  Inform patient and family  Sent message to update Ina

## 2024-08-29 NOTE — PLAN OF CARE
Physical Therapy Discharge Summary    Reason for therapy discharge:    Discharged to transitional care facility.    Progress towards therapy goal(s). See goals on Care Plan in [facility] electronic health record for goal details.  Goals partially met.  Barriers to achieving goals:   limited tolerance for therapy and discharge from facility.    Therapy recommendation(s):    Continued therapy is recommended.  Rationale/Recommendations:  PT goals not fully met.

## 2024-08-29 NOTE — PROGRESS NOTES
Care Management Discharge Note    Discharge Date: 08/29/2024       Discharge Disposition: Transitional Care - [facility]    Discharge Services: Transportation Services, [facility]    Discharge DME: None    Discharge Transportation: [facility] transport between 1440 - 1520  Private pay costs discussed: transportation costs    Does the patient's insurance plan have a 3 day qualifying hospital stay waiver?  No    PAS Confirmation Code: [id number]  Patient/family educated on Medicare website which has current facility and service quality ratings: yes    Education Provided on the Discharge Plan: Yes  Persons Notified of Discharge Plans: Patient  Patient/Family in Agreement with the Plan: yes    Handoff Referral Completed: Yes, [facility] PCP: Internal handoff referral completed    Additional Information:  Patient discharge to [facility] via [employer] transport between 1440 - 1520.    Orders sent to [facility]    Vanda Loera RN

## 2024-08-30 NOTE — LETTER
8/30/2024      Jacquelin Sampson  [address]  [place] [address]        [facility] GERIATRICS  INITIAL VISIT NOTE  August 30, 2024      PRIMARY CARE PROVIDER AND CLINIC:  Vicente Hernandez [address] [facility] [address]    [facility] Medical Record Number:  [id number]  Place of Service where encounter took place:  [facility] [[id number]]    Chief Complaint   Patient presents with     Hospital F/U       HPI:    Jacquelin Sampson is a 84 year old  (1940) female was admitted to the above facility from  [facility]. Hospital stay 8/25/2024 through 8/29/2024 where they were admitted for ***  Now admitted to this facility for  {FGS ADMISSION REASONS:576025}.      History obtained from: {FGS HPI:419039}.      Brief Hospital Course: ***    TCU Course: ***    CODE STATUS/ADVANCE DIRECTIVES: CPR/Full code     ALLERGIES:  No Known Allergies    PAST MEDICAL HISTORY:   No past medical history on file.    PAST SURGICAL HISTORY:   No past surgical history on file.    FAMILY HISTORY:   No family history on file.  Unable to review due to cognitive impairment***    SOCIAL HISTORY:   Patient's living condition: {LIVES WITH (NURSING HOME):235470}    MEDICATIONS  Post Discharge Medication Reconciliation Status: {ACO Med Rec (Provider):591377}.  Current Outpatient Medications   Medication Sig Dispense Refill     aspirin 81 MG EC tablet Take 81 mg by mouth daily.       calcium carbonate (OS-ESTELA) 500 MG tablet Take 1 tablet by mouth daily.       cyanocobalamin (VITAMIN B-12) 500 MCG tablet Take 500 mcg by mouth daily.       dexAMETHasone (DECADRON) 4 MG tablet Take 20 mg by mouth once a week. On Saturdays       droNABinol (MARINOL) 2.5 MG capsule Take 1 capsule (2.5 mg) by mouth 2 times daily. 5 capsule 0     ferrous sulfate 325 (65 FE) MG tablet Take 325 mg by mouth daily (with breakfast).       HYDROmorphone (DILAUDID) 2 MG tablet Take 1 tablet (2 mg) by  mouth every 4 hours as needed for breakthrough pain or moderate to severe pain. 5 tablet 0     LENalidomide (REVLIMID) 10 MG CAPS capsule Take 10 mg by mouth daily For 21 days, then 1 week off       magnesium oxide (MAG-OX) 400 MG tablet Take 1 tablet (400 mg) by mouth daily for 7 days.       metoprolol succinate ER (TOPROL XL) 50 MG 24 hr tablet Take 50 mg by mouth daily.       multivitamin, therapeutic (THERA-VIT) TABS tablet Take 1 tablet by mouth daily.       pantoprazole (PROTONIX) 40 MG EC tablet Take 1 tablet (40 mg) by mouth every morning (before breakfast).       potassium chloride ER (MICRO-K) 10 MEQ CR capsule Take 1 capsule (10 mEq) by mouth 2 times daily for 7 days.       pregabalin (LYRICA) 50 MG capsule TAKE 1 CAPSULE BY MOUTH TWICE DAILY 62 capsule 0     senna-docusate (SENOKOT-S/PERICOLACE) 8.6-50 MG tablet Take 1 tablet by mouth 2 times daily as needed for constipation.       simvastatin (ZOCOR) 10 MG tablet Take 10 mg by mouth at bedtime.       thiamine (B-1) 100 MG tablet Take 1 tablet (100 mg) by mouth daily for 10 days.         ROS:  10 point ROS neg other than the symptoms noted above in the HPI.***  Unable to obtain due to cognitive impairment or aphasia  ROS    PHYSICAL EXAM:  /62   Pulse 65   Temp 97  F (36.1  C)   Resp 18   Wt 49.3 kg (108 lb 9.6 oz)   SpO2 97%   BMI 19.86 kg/m    Physical Exam     LABORATORY/IMAGING DATA:  Reviewed as per [facility] and/or [facility]    ASSESSMENT/PLAN:  {FGS DX INITIAL:592156}    Orders:   ***    {FGS TIME SPENT:170983}    Electronically signed by:  Melida Ba                Sincerely,        NIRMAL Roe CNP

## 2024-08-30 NOTE — PROGRESS NOTES
Connected Care Resource Center: [facility]    Background: Transitional Care Management program identified per system criteria and reviewed by Conne[facility] Resource [facility] team for possible outreach.    Assessment: Upon chart review, CCRC Team member will not proceed with patient outreach related to this episode of Transitional Care Management program due to reason below:    Non-[facility] TCU: CCRC team member noted patient discharged to TCU/ARU/LTACH. Patient is not established with a [facility] currently supported by Primary Care-Care Coordination therefore handoff to Primary Care-Care Coordination is not appropriate at this time.    Plan: Transitional Care Management episode addressed appropriately per reason noted above.      JUDY Angelo  [facility] Care Resource [place], [facility]    *Connected Care Resource Team does NOT follow patient ongoing. Referrals are identified based on internal discharge reports and the outreach is to ensure patient has an understanding of their discharge instructions.

## 2024-08-30 NOTE — LETTER
8/30/2024      Jacquelin Sampson  [address]  [facility] [address]        [facility] GERIATRICS  INITIAL VISIT NOTE  August 30, 2024      PRIMARY CARE PROVIDER AND CLINIC:  Vicente Hernandez [address] [facility] / [facility] [address]    [facility] Medical Record Number:  [id number]  Place of Service where encounter took place:  [facility] ([facility]) [[id number]]    Chief Complaint   Patient presents with     Hospital F/U       HPI:    Jacquelin Sampson is a 84 year old  (1940) female was admitted to the above facility from  [facility]. Hospital stay 8/25/2024 through 8/29/2024 where they were admitted for Wedge compression fracture of L2 vertebra and laceration to scalp.  Now admitted to this facility for  rehab, medical management, and nursing care.      History obtained from: facility chart records, facility staff, patient report, [facility] chart review, and Care Everywhere [facility] chart review.  Daughter present today.    Brief Hospital Course:   Jacquelin is a 84 year old female with chronic conditions such as osteoporosis, severe DJD of lumbar spine, frequent falls, Hypertension, ASCVD, hyperlipidemia, and multiple myeloma.      On 8/25/24, Jacquelin presented to the [facility] ED with recurrent falls and back pain.      CT of spine showed:  CERVICAL SPINE CT:  1. No fracture or posttraumatic subluxation.     THORACIC SPINE CT:  1. No acute fracture or posttraumatic subluxation.  2. Chronic T6, T7, T8, and T11 compression fractures     LUMBAR SPINE CT:  1.  Acute L2 compression fracture with 40% height loss and 5 mm retropulsion of fracture fragments into the canal. This results in mild canal stenosis.  2.  Chronic L1 compression fracture.  3.  Severe left neural foraminal narrowing at L4-L5.  4.  Moderate to severe canal stenosis at L2-L3.    TLSO brace made for Jacquelin.  Follow up with Neurosurgery in 3-6 weeks recommended.  Pain medication as  needed.  TCU with PT/OT recommended and discharged to [facility].    Found to have new onset of Atrial Fibrillation on EKG.  Already on Metoprolol.  Rate controlled.  Due to recurrent falls, it was felt to hold off on anticoagulation.  CHADSVASC score 4.      TCU Course:   Meeting Jacquelin today in her room.  Has the TLSO brace which is uncomfortable for her.  Needs to be on when out of bed.    Petite female whom is alert and oriented to person with place and time being vague.  Has been cooperative with staff so far in taking her medications.  They have no concerns.      Jacquelin is calm and answered questions as best as she could.  Reviewed medications with her.  Noted that she is on dronabinol 2.5mg BID and Lyrica 50mg BID.      Goal is for her to work with therapies - PT and OT and then will leave returning back to community up in the air as usually therapies makes recommendations based on cognitive scores and her history of falls.    CODE STATUS/ADVANCE DIRECTIVES: CPR/Full code     ALLERGIES:  No Known Allergies    PAST MEDICAL HISTORY:   Past Medical History:   Diagnosis Date     Anemia      ASCVD (arteriosclerotic cardiovascular disease)      Chronic osteoarthritis      DJD (degenerative joint disease), lumbar      Hyperlipidemia      Malnutrition (H24)      Multiple myeloma (H)        PAST SURGICAL HISTORY:   Past Surgical History:   Procedure Laterality Date     APPENDECTOMY       HYSTERECTOMY N/A      LAMINECTOMY N/A      MASTECTOMY Bilateral      OOPHORECTOMY N/A      REPLACEMENT TOTAL KNEE Left        FAMILY HISTORY:   No family history on file.      SOCIAL HISTORY:   Patient's living condition: lives with spouse    MEDICATIONS  Post Discharge Medication Reconciliation Status: discharge medications reconciled, continue medications without change.  Current Outpatient Medications   Medication Sig Dispense Refill     aspirin 81 MG EC tablet Take 81 mg by mouth daily.       calcium carbonate (OS-ESTELA) 500  MG tablet Take 1 tablet by mouth daily.       cyanocobalamin (VITAMIN B-12) 500 MCG tablet Take 500 mcg by mouth daily.       dexAMETHasone (DECADRON) 4 MG tablet Take 20 mg by mouth once a week. On Saturdays       droNABinol (MARINOL) 2.5 MG capsule Take 1 capsule (2.5 mg) by mouth 2 times daily. 5 capsule 0     ferrous sulfate 325 (65 FE) MG tablet Take 325 mg by mouth daily (with breakfast).       HYDROmorphone (DILAUDID) 2 MG tablet Take 1 tablet (2 mg) by mouth every 4 hours as needed for breakthrough pain or moderate to severe pain. 5 tablet 0     LENalidomide (REVLIMID) 10 MG CAPS capsule Take 10 mg by mouth daily For 21 days, then 1 week off       magnesium oxide (MAG-OX) 400 MG tablet Take 1 tablet (400 mg) by mouth daily for 7 days.       metoprolol succinate ER (TOPROL XL) 50 MG 24 hr tablet Take 50 mg by mouth daily.       multivitamin, therapeutic (THERA-VIT) TABS tablet Take 1 tablet by mouth daily.       pantoprazole (PROTONIX) 40 MG EC tablet Take 1 tablet (40 mg) by mouth every morning (before breakfast).       potassium chloride ER (MICRO-K) 10 MEQ CR capsule Take 1 capsule (10 mEq) by mouth 2 times daily for 7 days.       pregabalin (LYRICA) 50 MG capsule TAKE 1 CAPSULE BY MOUTH TWICE DAILY 62 capsule 0     senna-docusate (SENOKOT-S/PERICOLACE) 8.6-50 MG tablet Take 1 tablet by mouth 2 times daily as needed for constipation.       simvastatin (ZOCOR) 10 MG tablet Take 10 mg by mouth at bedtime.       thiamine (B-1) 100 MG tablet Take 1 tablet (100 mg) by mouth daily for 10 days.         ROS:  10 point ROS neg other than the symptoms noted above in the HPI.  No chest pain, no SOB.      PHYSICAL EXAM:  /62   Pulse 65   Temp 97  F (36.1  C)   Resp 18   Wt 49.3 kg (108 lb 9.6 oz)   SpO2 97%   BMI 19.86 kg/m    Petite female neatly groomed.    Skin Is pink, dry and warm.  Able to move neck to look at NP and make eye contact.  Does not wear glasses.  Sclera's clear.  Normal eyelids.  EOMs  intact.  Lungs clear.    Abdomen is flat, soft, and non-tender.    A1 with transfers for safety.  TLSO brace when out of bed.   No edema in lower legs.         LABORATORY/IMAGING DATA:  Reviewed as per [facility] and/or [facility]    Component      Latest Ref Rng 8/29/2024  8:55 AM   Color Urine      Colorless, Straw, Light Yellow, Yellow  Light Yellow    Appearance Urine      Clear  Clear    Glucose Urine      Negative mg/dL Negative    Bilirubin Urine      Negative  Negative    Ketones Urine      Negative mg/dL 20 !    Specific Gravity Urine      1.001 - 1.030  1.018    Blood Urine      Negative  0.1 mg/dL !    pH Urine      5.0 - 7.0  6.5    Protein Albumin Urine      Negative mg/dL 10 !    Urobilinogen mg/dL      <2.0 mg/dL <2.0    Nitrite Urine      Negative  Negative    Leukocyte Esterase Urine      Negative  Negative    Mucus Urine      None Seen /LPF Present !    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  Outpatient neurosurgery in 3-4 weeks.  Will check if staff make an appointment yet  Has PRN Dilaudid available.  No scheduled Tylenol as she has Pregabalin 50mg BID.  New to facility so will need to see how her pain is and gauge her usage of medications.  Can make adjustments later if needed.    Provide a safe and clutter free environment.    (S01.01XD) Laceration of scalp, subsequent encounter  Comment: laceration is closed and open to air over occipital area.  No treatment to be done.      (I48.91) New onset atrial  fibrillation (H)  Comment: is not a candidate for blood thinners like coumadin due to repeat falls.  Is on a ASA 81mg po daily.  Is on metoprolol succinate 50mg po daily.  CHADvasc score = 4    (I10) Essential hypertension  Comment: blood pressures range so far 110-120's/60's.  Is on the metoprolol XL 50mg po daily.  Vitals daily.    (C90.00) Multiple myeloma not having achieved remission (H)  Comment: currently on lenalidomide 10mg daily x 21 days and on dexamethasone 20mg once a day on Saturdays.  At this time will need to speak with family about treatment and any upcoming appointments.  Feel with this recent health change, this may off  set her and so goal right now is to focus on getting her stronger.    (E78.2) Mixed hyperlipidemia  Comment: will remain on simvastatin 10mg po daily.  Monitor outpatient.     Orders:    CBC and BMP on Tuesday next week due to polyneuropathy and wedge Compression lumbar fracture    Total time spent with patient visit at the skilled nursing facility was 30 min including patient visit and review of past records. Greater than 50% of total time spent with counseling and coordinating care due to gathering information, discussion with staff, plan of care while on TCU.    Electronically signed by:  NIRMAL Roe CNP                Sincerely,        NIRMAL Roe CNP

## 2024-08-30 NOTE — PLAN OF CARE
Occupational Therapy Discharge Summary    Reason for therapy discharge:    Discharged to transitional care facility.    Progress towards therapy goal(s). See goals on Care Plan in [facility] electronic health record for goal details.  Goals partially met.  Barriers to achieving goals:   discharge from facility.    Therapy recommendation(s):    Continued therapy is recommended.  Rationale/Recommendations:  Cont OT in TCU setting.    SHARI Chavez. 8/30/2024, 7:39 AM

## 2024-08-30 NOTE — PROGRESS NOTES
[facility] GERIATRICS  INITIAL VISIT NOTE  August 30, 2024      PRIMARY CARE PROVIDER AND CLINIC:  Vicente Hernandez [address] [facility] / [facility] [address]    [facility] Medical Record Number:  [id number]  Place of Service where encounter took place:  [facility] ([facility]) [[id number]]    Chief Complaint   Patient presents with    Hospital F/U       HPI:    Jacquelin Sampson is a 84 year old  (1940) female was admitted to the above facility from  [facility]. Hospital stay 8/25/2024 through 8/29/2024 where they were admitted for Wedge compression fracture of L2 vertebra and laceration to scalp.  Now admitted to this facility for  rehab, medical management, and nursing care.      History obtained from: facility chart records, facility staff, patient report, [facility] chart review, and Care Everywhere [facility] chart review.  Daughter present today.    Brief Hospital Course:   Jacquelin is a 84 year old female with chronic conditions such as osteoporosis, severe DJD of lumbar spine, frequent falls, Hypertension, ASCVD, hyperlipidemia, and multiple myeloma.      On 8/25/24, Jacquelin presented to the [facility] ED with recurrent falls and back pain.      CT of spine showed:  CERVICAL SPINE CT:  1. No fracture or posttraumatic subluxation.     THORACIC SPINE CT:  1. No acute fracture or posttraumatic subluxation.  2. Chronic T6, T7, T8, and T11 compression fractures     LUMBAR SPINE CT:  1.  Acute L2 compression fracture with 40% height loss and 5 mm retropulsion of fracture fragments into the canal. This results in mild canal stenosis.  2.  Chronic L1 compression fracture.  3.  Severe left neural foraminal narrowing at L4-L5.  4.  Moderate to severe canal stenosis at L2-L3.    TLSO brace made for Jacquelin.  Follow up with Neurosurgery in 3-6 weeks recommended.  Pain medication as needed.  TCU with PT/OT recommended and discharged to [facility].    Found to have new  onset of Atrial Fibrillation on EKG.  Already on Metoprolol.  Rate controlled.  Due to recurrent falls, it was felt to hold off on anticoagulation.  CHADSVASC score 4.      TCU Course: ***    CODE STATUS/ADVANCE DIRECTIVES: CPR/Full code     ALLERGIES:  No Known Allergies    PAST MEDICAL HISTORY:   Past Medical History:   Diagnosis Date    Anemia     ASCVD (arteriosclerotic cardiovascular disease)     Chronic osteoarthritis     DJD (degenerative joint disease), lumbar     Hyperlipidemia     Malnutrition (H24)     Multiple myeloma (H)        PAST SURGICAL HISTORY:   No past surgical history on file.    FAMILY HISTORY:   No family history on file.      SOCIAL HISTORY:   Patient's living condition: {LIVES WITH (NURSING HOME):132333}    MEDICATIONS  Post Discharge Medication Reconciliation Status: {[facility] Med Rec (Provider):389276}.  Current Outpatient Medications   Medication Sig Dispense Refill    aspirin 81 MG EC tablet Take 81 mg by mouth daily.      calcium carbonate (OS-ESTELA) 500 MG tablet Take 1 tablet by mouth daily.      cyanocobalamin (VITAMIN B-12) 500 MCG tablet Take 500 mcg by mouth daily.      dexAMETHasone (DECADRON) 4 MG tablet Take 20 mg by mouth once a week. On Saturdays      droNABinol (MARINOL) 2.5 MG capsule Take 1 capsule (2.5 mg) by mouth 2 times daily. 5 capsule 0    ferrous sulfate 325 (65 FE) MG tablet Take 325 mg by mouth daily (with breakfast).      HYDROmorphone (DILAUDID) 2 MG tablet Take 1 tablet (2 mg) by mouth every 4 hours as needed for breakthrough pain or moderate to severe pain. 5 tablet 0    LENalidomide (REVLIMID) 10 MG CAPS capsule Take 10 mg by mouth daily For 21 days, then 1 week off      magnesium oxide (MAG-OX) 400 MG tablet Take 1 tablet (400 mg) by mouth daily for 7 days.      metoprolol succinate ER (TOPROL XL) 50 MG 24 hr tablet Take 50 mg by mouth daily.      multivitamin, therapeutic (THERA-VIT) TABS tablet Take 1 tablet by mouth daily.      pantoprazole (PROTONIX) 40 MG EC  tablet Take 1 tablet (40 mg) by mouth every morning (before breakfast).      potassium chloride ER (MICRO-K) 10 MEQ CR capsule Take 1 capsule (10 mEq) by mouth 2 times daily for 7 days.      pregabalin (LYRICA) 50 MG capsule TAKE 1 CAPSULE BY MOUTH TWICE DAILY 62 capsule 0    senna-docusate (SENOKOT-S/PERICOLACE) 8.6-50 MG tablet Take 1 tablet by mouth 2 times daily as needed for constipation.      simvastatin (ZOCOR) 10 MG tablet Take 10 mg by mouth at bedtime.      thiamine (B-1) 100 MG tablet Take 1 tablet (100 mg) by mouth daily for 10 days.         ROS:  10 point ROS neg other than the symptoms noted above in the HPI.***  Unable to obtain due to cognitive impairment or aphasia  ROS    PHYSICAL EXAM:  /62   Pulse 65   Temp 97  F (36.1  C)   Resp 18   Wt 49.3 kg (108 lb 9.6 oz)   SpO2 97%   BMI 19.86 kg/m    Physical Exam     LABORATORY/IMAGING DATA:  Reviewed as per [facility] and/or [facility]    ASSESSMENT/PLAN:  {FGS DX INITIAL:071483}    Orders:   ***    {FGS TIME SPENT:907024}    Electronically signed by:  Melida Ba             bed.   No edema in lower legs.         LABORATORY/IMAGING DATA:  [facility]    Component      Latest Ref Rng 8/29/2024  8:55 AM   Color Urine      Colorless, Straw, Light Yellow, Yellow  Light Yellow    Appearance Urine      Clear  Clear    Glucose Urine      Negative mg/dL Negative    Bilirubin Urine      Negative  Negative    Ketones Urine      Negative mg/dL 20 !    Specific Gravity Urine      1.001 - 1.030  1.018    Blood Urine      Negative  0.1 mg/dL !    pH Urine      5.0 - 7.0  6.5    Protein Albumin Urine      Negative mg/dL 10 !    Urobilinogen mg/dL      <2.0 mg/dL <2.0    Nitrite Urine      Negative  Negative    Leukocyte Esterase Urine      Negative  Negative    Mucus Urine      None Seen /LPF Present !    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  Outpatient neurosurgery in 3-4 weeks.  Will check if staff make an appointment yet  Has PRN Dilaudid available.  No scheduled Tylenol as she has Pregabalin 50mg BID.  New to facility so will need to see how her pain is and gauge her usage of medications.  Can make adjustments later if needed.    Provide a safe and clutter free environment.    (S01.01XD) Laceration of scalp, subsequent encounter  Comment: laceration is closed and open to air over occipital area.  No treatment to be done.      (I48.91) New onset atrial fibrillation (H)  Comment: is not a candidate for blood thinners like coumadin due to repeat falls.  Is on a ASA 81mg po daily.  Is  on metoprolol succinate 50mg po daily.  CHADvasc score = 4    (I10) Essential hypertension  Comment: blood pressures range so far 110-120's/60's.  Is on the metoprolol XL 50mg po daily.  Vitals daily.    (C90.00) Multiple myeloma not having achieved remission (H)  Comment: currently on lenalidomide 10mg daily x 21 days and on dexamethasone 20mg once a day on Saturdays.  At this time will need to speak with family about treatment and any upcoming appointments.  Feel with this recent health change, this may off  set her and so goal right now is to focus on getting her stronger.    (E78.2) Mixed hyperlipidemia  Comment: will remain on simvastatin 10mg po daily.  Monitor outpatient.     Orders:    CBC and BMP on Tuesday next week due to polyneuropathy and wedge Compression lumbar fracture    Total time spent with patient visit at the skilled nursing facility was 30 min including patient visit and review of past records. Greater than 50% of total time spent with counseling and coordinating care due to gathering information, discussion with staff, plan of care while on TCU.    Electronically signed by:  NIRMAL Roe CNP

## 2024-09-03 NOTE — LETTER
9/3/2024      Jacquelin Sampson  [address]  [facility] [id number]        [facility]    Code Status:  FULL CODE   Visit Type:   Chief Complaint   Patient presents with     TCU Follow Up     Facility:  [facility] ([facility]) [[id number]]         HPI: Jacquelin Sampson is a 84 year old female who I am seeing today for follow up on the TCU. Past medical history includes recurrent falls, severe L2-L3 stenosis, hypocalcemia, malnutrition, MM, HTN, HL and polyneuropathy. Pt with recurrent falls. Denies syncopal event, seizures or loss of consciousness.Severe DJD of the spine. Pt found to have acute L2 compression fracture. Pt treated conservatively with splinting. Pain management with Dilaudid and Lyrica. Occipital laceration. New onset of A fib on metoprolol. No AC due to recurrent falls. Malnutrition.       Transitional Care Course: Today pt lying in bed. Pt continues to complain of pain in her back. She continues on Marinol, Diluadid and Lyrica for pain. She is moving all extremities. No Edema. She denies any radiculopathy. She denies any SOB or CP. She reports her bowels are moving regularly and she is emptying her bladder.         Assessment/Plan:     L2 compression fracture  Recurrent falls  Severe L2-L3 stenosis  -Recurrent falls   -MRI showed  L2 compression fracture  -TLSO brace when OOB.   -Continue tylenol, Lyrica and Dilaudid for pain.   -Outpatient Neurosurgery follow up in 3-6 weeks    Severe Hypokalemia  Hypomagnesemia  -continues on supplement.   -BMP reviewed today with K+ of 4.4.   -Mg+ WNL.      Hypocalcemia  -Ionized calcium low  -received IV Calcium gluconate during hospitalization.   -Continue calcium carbonate.     Malnutrition  -Due to poor po intake  -Continue Marinol for appetite  -RD consult    New Atrial fibrillation  -EKG with new onset A.fib  -Metoprolol 50 mg  -No AC due to recurrent falls.      Multiple myeloma  Pancytopenia  -PET scan on 4/2/2024 revealed no  evidence of relapse or progression of multiple myeloma and no significant change compared to PET scan performed on 3/23/2022 PET/CT.  -Continues on  Lenalidomide (off 1 week since 08/25) and Weekly Decadron at home  -Followed by [facility]     Polyneuropathy  -PTA pregabalin     Hypertension  -PTA metoprolol succinate 50 mg daily     Hyperlipidemia  -PTA simvastatin     CBC, BMP today reviewed and is unremarkable.     Active Ambulatory Problems     Diagnosis Date Noted     Repeated falls 06/21/2023     PVC's (premature ventricular contractions) 11/20/2017     Multiple myeloma (H) 05/13/2013     Mixed hyperlipidemia 03/08/2009     Drug-induced polyneuropathy (H24) 12/12/2017     Drug-induced neutropenia (H24) 01/10/2017     Displacement of cervical intervertebral disc without myelopathy 03/07/2007     Fall, initial encounter 08/25/2024     Scalp laceration, initial encounter 08/25/2024     Closed compression fracture of L2 lumbar vertebra, initial encounter (H) 08/25/2024     Weakness 08/26/2024     Acute midline low back pain without sciatica 08/26/2024     Age-related osteoporosis without current pathological fracture 08/14/2020     Anemia 10/12/2023     Abnormal weight loss 09/09/2021     Numbness of foot 09/09/2014     Musculoskeletal pain 05/11/2023     Iron deficiency anemia 08/06/2024     Impaired fasting glucose 10/19/2016     History of skin cancer 11/28/2019     Esophageal web determined by endoscopy 05/28/2021     Enthesopathy of hip region 03/07/2007     Ecchymosis 06/21/2023     Degeneration of lumbar or lumbosacral intervertebral disc 03/07/2007     Coronary artery disease involving native coronary artery of native heart without angina pectoris 08/15/2007     Pain in left hip 04/13/2017     Presence of left artificial knee joint 05/11/2023     Primary osteoarthritis of left knee 11/06/2018     Rash 06/12/2019     Tumor lysis syndrome 07/28/2013     Noninfectious gastroenteritis 12/22/2009      Diverticular disease of colon 12/22/2009     Resolved Ambulatory Problems     Diagnosis Date Noted     No Resolved Ambulatory Problems     Past Medical History:   Diagnosis Date     ASCVD (arteriosclerotic cardiovascular disease)      Chronic osteoarthritis      DJD (degenerative joint disease), lumbar      Hyperlipidemia      Malnutrition (H24)      No Known Allergies    All Meds and Allergies reviewed in the record at the facility and is the most up-to-date.    Current Outpatient Medications   Medication Sig Dispense Refill     aspirin 81 MG EC tablet Take 81 mg by mouth daily.       calcium carbonate (OS-ESTELA) 500 MG tablet Take 1 tablet by mouth daily.       cyanocobalamin (VITAMIN B-12) 500 MCG tablet Take 500 mcg by mouth daily.       dexAMETHasone (DECADRON) 4 MG tablet Take 20 mg by mouth once a week. On Saturdays       droNABinol (MARINOL) 2.5 MG capsule TAKE 1 CAPSULE BY MOUTH TWICE DAILY 60 capsule 0     ferrous sulfate 325 (65 FE) MG tablet Take 325 mg by mouth daily (with breakfast).       HYDROmorphone (DILAUDID) 2 MG tablet TAKE 1 TABLET BY MOUTH EVERY 4 HOURS AS NEEDED 30 tablet 0     HYDROmorphone (DILAUDID) 2 MG tablet Take 1 tablet (2 mg) by mouth every 4 hours as needed for breakthrough pain or moderate to severe pain. 24 tablet 0     LENalidomide (REVLIMID) 10 MG CAPS capsule Take 10 mg by mouth daily For 21 days, then 1 week off       magnesium oxide (MAG-OX) 400 MG tablet Take 1 tablet (400 mg) by mouth daily for 7 days.       metoprolol succinate ER (TOPROL XL) 50 MG 24 hr tablet Take 50 mg by mouth daily.       multivitamin, therapeutic (THERA-VIT) TABS tablet Take 1 tablet by mouth daily.       pantoprazole (PROTONIX) 40 MG EC tablet Take 1 tablet (40 mg) by mouth every morning (before breakfast).       potassium chloride ER (MICRO-K) 10 MEQ CR capsule Take 1 capsule (10 mEq) by mouth 2 times daily for 7 days.       pregabalin (LYRICA) 50 MG capsule TAKE 1 CAPSULE BY MOUTH TWICE DAILY 62  "capsule 0     senna-docusate (SENOKOT-S/PERICOLACE) 8.6-50 MG tablet Take 1 tablet by mouth 2 times daily as needed for constipation.       simvastatin (ZOCOR) 10 MG tablet Take 10 mg by mouth at bedtime.       thiamine (B-1) 100 MG tablet Take 1 tablet (100 mg) by mouth daily for 10 days.       No current facility-administered medications for this visit.       REVIEW OF SYSTEMS:   10 point review of systems reviewed and pertinent positives in the HPI.     PHYSICAL EXAMINATION:  Physical Exam     Vital signs: /60   Pulse 76   Temp 98.7  F (37.1  C)   Resp 16   Ht 1.575 m (5' 2\")   Wt 45.3 kg (99 lb 12.8 oz)   SpO2 98%   BMI 18.25 kg/m    General: Awake, Alert, oriented x1, lying in bed, follows simple commands  HEENT: Pink conjunctiva, dry oral mucosa  NECK: Supple  CVS:  S1  S2, without murmur or gallop.   LUNG: Clear to auscultation, No wheezes, rales or rhonci.  BACK: No outpouching. No redness or warmth.   ABDOMEN: Soft, thin, non tender to palpation, with positive bowel sounds  EXTREMITIES: Moves both upper and lower extremities with diffuse weakness, no pedal edema, no calf tenderness  SKIN: Warm and dry  NEUROLOGIC: Intact, pulses palpable  PSYCHIATRIC: Cognitive impairment noted.       Labs:  All labs reviewed in the nursing home record and Epic   @  Lab Results   Component Value Date    WBC 3.3 09/03/2024    WBC 5.8 06/13/2013     Lab Results   Component Value Date    RBC 2.90 09/03/2024    RBC 3.33 06/13/2013     Lab Results   Component Value Date    HGB 8.9 09/03/2024    HGB 10.2 06/13/2013     Lab Results   Component Value Date    HCT 28.8 09/03/2024    HCT 32.0 06/13/2013     Lab Results   Component Value Date    MCV 99 09/03/2024    MCV 96 06/13/2013     Lab Results   Component Value Date    MCH 30.7 09/03/2024    MCH 30.6 06/13/2013     Lab Results   Component Value Date    MCHC 30.9 09/03/2024    MCHC 31.9 06/13/2013     Lab Results   Component Value Date    RDW 16.1 09/03/2024    RDW " 12.6 06/13/2013     Lab Results   Component Value Date     09/03/2024     06/13/2013        @Last Comprehensive Metabolic Panel:  Sodium   Date Value Ref Range Status   09/03/2024 140 135 - 145 mmol/L Final   06/13/2013 140 133 - 144 mmol/L Final     Potassium   Date Value Ref Range Status   09/03/2024 4.4 3.4 - 5.3 mmol/L Final   06/13/2013 4.2 3.4 - 5.3 mmol/L Final     Chloride   Date Value Ref Range Status   09/03/2024 104 98 - 107 mmol/L Final   06/13/2013 99 94 - 109 mmol/L Final     Carbon Dioxide   Date Value Ref Range Status   06/13/2013 30 20 - 32 mmol/L Final     Carbon Dioxide (CO2)   Date Value Ref Range Status   09/03/2024 27 22 - 29 mmol/L Final     Anion Gap   Date Value Ref Range Status   09/03/2024 9 7 - 15 mmol/L Final   06/13/2013 12 6 - 17 mmol/L Final     Glucose   Date Value Ref Range Status   09/03/2024 82 70 - 99 mg/dL Final   06/13/2013 113 (H) 60 - 99 mg/dL Final     GLUCOSE BY METER POCT   Date Value Ref Range Status   08/29/2024 79 70 - 99 mg/dL Final     Urea Nitrogen   Date Value Ref Range Status   09/03/2024 19.7 8.0 - 23.0 mg/dL Final   06/13/2013 36 (H) 7 - 30 mg/dL Final     Creatinine   Date Value Ref Range Status   09/03/2024 0.89 0.51 - 0.95 mg/dL Final   06/13/2013 1.02 0.52 - 1.04 mg/dL Final     GFR Estimate   Date Value Ref Range Status   09/03/2024 63 >60 mL/min/1.73m2 Final   06/13/2013 53 (L) >60 mL/min/1.7m2 Final     Calcium   Date Value Ref Range Status   09/03/2024 8.9 8.8 - 10.4 mg/dL Final     Comment:     Reference intervals for this test were updated on 7/16/2024 to reflect our healthy population more accurately. There may be differences in the flagging of prior results with similar values performed with this method. Those prior results can be interpreted in the context of the updated reference intervals.   06/13/2013 10.1 8.5 - 10.4 mg/dL Final       > 45 minutes spent preparing for this visit which included reviewing hospital records, labs, imaging  consults and face to face time spent with pt and collaborating with nursing staff.     This note has been dictated using voice recognition software. Any grammatical or context distortions are unintentional and inherent to the software    Electronically signed by: Margaux Juarez CNP       Sincerely,        Margaux Juarez NP

## 2024-09-04 NOTE — PROGRESS NOTES
[facility]    Code Status:  FULL CODE   Visit Type:   Chief Complaint   Patient presents with    TCU Follow Up     Facility:  [facility] ([facility]) [[id number]]         HPI: Jacquelin Sampson is a 84 year old female who I am seeing today for follow up on the TCU. Past medical history includes recurrent falls, severe L2-L3 stenosis, hypocalcemia, malnutrition, MM, HTN, HL and polyneuropathy. Pt with recurrent falls. Denies syncopal event, seizures or loss of consciousness.Severe DJD of the spine. Pt found to have acute L2 compression fracture. Pt treated conservatively with splinting. Pain management with Dilaudid and Lyrica. Occipital laceration. New onset of A fib on metoprolol. No AC due to recurrent falls. Malnutrition.       Transitional Care Course: Today pt lying in bed. Pt continues to complain of pain in her back. She continues on Marinol, Diluadid and Lyrica for pain. She is moving all extremities. No Edema. She denies any radiculopathy. She denies any SOB or CP. She reports her bowels are moving regularly and she is emptying her bladder.         Assessment/Plan:     L2 compression fracture  Recurrent falls  Severe L2-L3 stenosis  -Recurrent falls   -MRI showed  L2 compression fracture  -TLSO brace when OOB.   -Continue tylenol, Lyrica and Dilaudid for pain.   -Outpatient Neurosurgery follow up in 3-6 weeks    Severe Hypokalemia  Hypomagnesemia  -continues on supplement.   -BMP reviewed today with K+ of 4.4.   -Mg+ WNL.      Hypocalcemia  -Ionized calcium low  -received IV Calcium gluconate during hospitalization.   -Continue calcium carbonate.     Malnutrition  -Due to poor po intake  -Continue Marinol for appetite  -RD consult    New Atrial fibrillation  -EKG with new onset A.fib  -Metoprolol 50 mg  -No AC due to recurrent falls.      Multiple myeloma  Pancytopenia  -PET scan on 4/2/2024 revealed no evidence of relapse or progression of multiple myeloma and no significant change compared  to PET scan performed on 3/23/2022 PET/CT.  -Continues on  Lenalidomide (off 1 week since 08/25) and Weekly Decadron at home  -Followed by [facility]     Polyneuropathy  -PTA pregabalin     Hypertension  -PTA metoprolol succinate 50 mg daily     Hyperlipidemia  -PTA simvastatin     CBC, BMP today reviewed and is unremarkable.     Active Ambulatory Problems     Diagnosis Date Noted    Repeated falls 06/21/2023    PVC's (premature ventricular contractions) 11/20/2017    Multiple myeloma (H) 05/13/2013    Mixed hyperlipidemia 03/08/2009    Drug-induced polyneuropathy (H24) 12/12/2017    Drug-induced neutropenia (H24) 01/10/2017    Displacement of cervical intervertebral disc without myelopathy 03/07/2007    Fall, initial encounter 08/25/2024    Scalp laceration, initial encounter 08/25/2024    Closed compression fracture of L2 lumbar vertebra, initial encounter (H) 08/25/2024    Weakness 08/26/2024    Acute midline low back pain without sciatica 08/26/2024    Age-related osteoporosis without current pathological fracture 08/14/2020    Anemia 10/12/2023    Abnormal weight loss 09/09/2021    Numbness of foot 09/09/2014    Musculoskeletal pain 05/11/2023    Iron deficiency anemia 08/06/2024    Impaired fasting glucose 10/19/2016    History of skin cancer 11/28/2019    Esophageal web determined by endoscopy 05/28/2021    Enthesopathy of hip region 03/07/2007    Ecchymosis 06/21/2023    Degeneration of lumbar or lumbosacral intervertebral disc 03/07/2007    Coronary artery disease involving native coronary artery of native heart without angina pectoris 08/15/2007    Pain in left hip 04/13/2017    Presence of left artificial knee joint 05/11/2023    Primary osteoarthritis of left knee 11/06/2018    Rash 06/12/2019    Tumor lysis syndrome 07/28/2013    Noninfectious gastroenteritis 12/22/2009    Diverticular disease of colon 12/22/2009     Resolved Ambulatory Problems     Diagnosis Date Noted    No Resolved Ambulatory  Problems     Past Medical History:   Diagnosis Date    ASCVD (arteriosclerotic cardiovascular disease)     Chronic osteoarthritis     DJD (degenerative joint disease), lumbar     Hyperlipidemia     Malnutrition (H24)      No Known Allergies    All Meds and Allergies reviewed in the record at the facility and is the most up-to-date.    Current Outpatient Medications   Medication Sig Dispense Refill    aspirin 81 MG EC tablet Take 81 mg by mouth daily.      calcium carbonate (OS-ESTELA) 500 MG tablet Take 1 tablet by mouth daily.      cyanocobalamin (VITAMIN B-12) 500 MCG tablet Take 500 mcg by mouth daily.      dexAMETHasone (DECADRON) 4 MG tablet Take 20 mg by mouth once a week. On Saturdays      droNABinol (MARINOL) 2.5 MG capsule TAKE 1 CAPSULE BY MOUTH TWICE DAILY 60 capsule 0    ferrous sulfate 325 (65 FE) MG tablet Take 325 mg by mouth daily (with breakfast).      HYDROmorphone (DILAUDID) 2 MG tablet TAKE 1 TABLET BY MOUTH EVERY 4 HOURS AS NEEDED 30 tablet 0    HYDROmorphone (DILAUDID) 2 MG tablet Take 1 tablet (2 mg) by mouth every 4 hours as needed for breakthrough pain or moderate to severe pain. 24 tablet 0    LENalidomide (REVLIMID) 10 MG CAPS capsule Take 10 mg by mouth daily For 21 days, then 1 week off      magnesium oxide (MAG-OX) 400 MG tablet Take 1 tablet (400 mg) by mouth daily for 7 days.      metoprolol succinate ER (TOPROL XL) 50 MG 24 hr tablet Take 50 mg by mouth daily.      multivitamin, therapeutic (THERA-VIT) TABS tablet Take 1 tablet by mouth daily.      pantoprazole (PROTONIX) 40 MG EC tablet Take 1 tablet (40 mg) by mouth every morning (before breakfast).      potassium chloride ER (MICRO-K) 10 MEQ CR capsule Take 1 capsule (10 mEq) by mouth 2 times daily for 7 days.      pregabalin (LYRICA) 50 MG capsule TAKE 1 CAPSULE BY MOUTH TWICE DAILY 62 capsule 0    senna-docusate (SENOKOT-S/PERICOLACE) 8.6-50 MG tablet Take 1 tablet by mouth 2 times daily as needed for constipation.      simvastatin  "(ZOCOR) 10 MG tablet Take 10 mg by mouth at bedtime.      thiamine (B-1) 100 MG tablet Take 1 tablet (100 mg) by mouth daily for 10 days.       No current facility-administered medications for this visit.       REVIEW OF SYSTEMS:   10 point review of systems reviewed and pertinent positives in the HPI.     PHYSICAL EXAMINATION:  Physical Exam     Vital signs: /60   Pulse 76   Temp 98.7  F (37.1  C)   Resp 16   Ht 1.575 m (5' 2\")   Wt 45.3 kg (99 lb 12.8 oz)   SpO2 98%   BMI 18.25 kg/m    General: Awake, Alert, oriented x1, lying in bed, follows simple commands  HEENT: Pink conjunctiva, dry oral mucosa  NECK: Supple  CVS:  S1  S2, without murmur or gallop.   LUNG: Clear to auscultation, No wheezes, rales or rhonci.  BACK: No outpouching. No redness or warmth.   ABDOMEN: Soft, thin, non tender to palpation, with positive bowel sounds  EXTREMITIES: Moves both upper and lower extremities with diffuse weakness, no pedal edema, no calf tenderness  SKIN: Warm and dry  NEUROLOGIC: Intact, pulses palpable  PSYCHIATRIC: Cognitive impairment noted.       Labs:  All labs reviewed in the nursing home record and Epic   @  Lab Results   Component Value Date    WBC 3.3 09/03/2024    WBC 5.8 06/13/2013     Lab Results   Component Value Date    RBC 2.90 09/03/2024    RBC 3.33 06/13/2013     Lab Results   Component Value Date    HGB 8.9 09/03/2024    HGB 10.2 06/13/2013     Lab Results   Component Value Date    HCT 28.8 09/03/2024    HCT 32.0 06/13/2013     Lab Results   Component Value Date    MCV 99 09/03/2024    MCV 96 06/13/2013     Lab Results   Component Value Date    MCH 30.7 09/03/2024    MCH 30.6 06/13/2013     Lab Results   Component Value Date    MCHC 30.9 09/03/2024    MCHC 31.9 06/13/2013     Lab Results   Component Value Date    RDW 16.1 09/03/2024    RDW 12.6 06/13/2013     Lab Results   Component Value Date     09/03/2024     06/13/2013        @Last Comprehensive Metabolic Panel:  Sodium "   Date Value Ref Range Status   09/03/2024 140 135 - 145 mmol/L Final   06/13/2013 140 133 - 144 mmol/L Final     Potassium   Date Value Ref Range Status   09/03/2024 4.4 3.4 - 5.3 mmol/L Final   06/13/2013 4.2 3.4 - 5.3 mmol/L Final     Chloride   Date Value Ref Range Status   09/03/2024 104 98 - 107 mmol/L Final   06/13/2013 99 94 - 109 mmol/L Final     Carbon Dioxide   Date Value Ref Range Status   06/13/2013 30 20 - 32 mmol/L Final     Carbon Dioxide (CO2)   Date Value Ref Range Status   09/03/2024 27 22 - 29 mmol/L Final     Anion Gap   Date Value Ref Range Status   09/03/2024 9 7 - 15 mmol/L Final   06/13/2013 12 6 - 17 mmol/L Final     Glucose   Date Value Ref Range Status   09/03/2024 82 70 - 99 mg/dL Final   06/13/2013 113 (H) 60 - 99 mg/dL Final     GLUCOSE BY METER POCT   Date Value Ref Range Status   08/29/2024 79 70 - 99 mg/dL Final     Urea Nitrogen   Date Value Ref Range Status   09/03/2024 19.7 8.0 - 23.0 mg/dL Final   06/13/2013 36 (H) 7 - 30 mg/dL Final     Creatinine   Date Value Ref Range Status   09/03/2024 0.89 0.51 - 0.95 mg/dL Final   06/13/2013 1.02 0.52 - 1.04 mg/dL Final     GFR Estimate   Date Value Ref Range Status   09/03/2024 63 >60 mL/min/1.73m2 Final   06/13/2013 53 (L) >60 mL/min/1.7m2 Final     Calcium   Date Value Ref Range Status   09/03/2024 8.9 8.8 - 10.4 mg/dL Final     Comment:     Reference intervals for this test were updated on 7/16/2024 to reflect our healthy population more accurately. There may be differences in the flagging of prior results with similar values performed with this method. Those prior results can be interpreted in the context of the updated reference intervals.   06/13/2013 10.1 8.5 - 10.4 mg/dL Final       > 45 minutes spent preparing for this visit which included reviewing hospital records, labs, imaging consults and face to face time spent with pt and collaborating with nursing staff.     This note has been dictated using voice recognition software.  Any grammatical or context distortions are unintentional and inherent to the software    Electronically signed by: Margaux Juarez CNP

## 2024-09-05 NOTE — LETTER
9/5/2024      Jacquelin Sampson  [address]  [facility] [id number]         HEALTH GERIATRIC SERVICES    Code Status:  FULL CODE   Visit Type:   Chief Complaint   Patient presents with     Tcu Follow Up     Facility:  [facility] ([facility]) [[id number]]         HPI: Jacquelin Sampson is a 84 year old female who I am seeing today for follow up on the TCU. Past medical history includes recurrent falls, severe L2-L3 stenosis, hypocalcemia, malnutrition, MM, HTN, HL and polyneuropathy. Pt with recurrent falls. Denies syncopal event, seizures or loss of consciousness.Severe DJD of the spine. Pt found to have acute L2 compression fracture. Pt treated conservatively with splinting. Pain management with Dilaudid and Lyrica. Occipital laceration. New onset of A fib on metoprolol. No AC due to recurrent falls. Malnutrition.       Transitional Care Course: Today pt lying in bed.  Patient's daughter is present on exam.  Patient lying in bed with her TLSO splinting on.  She continues to complain of pain in her back greater while laying in bed.  I did review with both patient and her daughter that her TLSO splint may be contributing to her pain while lying in bed.  Splint should be on out of bed.  Daughter is asking for better control of her pain.  Patient continues on as needed Dilaudid every 4 hours and Lyrica twice daily.  We talked about scheduling her pain medication.  Look back in the records patient has taken Dilaudid 2-4 times daily.  She also continues on Tylenol.  Patient with poor oral intake.  Weight down to 97 pounds.  She continues on Marinol for appetite support.  She denies any nausea or vomiting.  No pain in her abdomen.  She reports she is having regular bowel movement and emptying her bladder.  A-fib well-controlled metoprolol.    Assessment/Plan:     L2 compression fracture  Recurrent falls  Severe L2-L3 stenosis  -Recurrent falls   -MRI showed  L2 compression fracture  -TLSO brace when OOB.  Avoid  use while lying in bed.  This may be contributory to increased pain.  -Continue tylenol, Lyrica.  Scheduled Dilaudid 2 mg in the AM and nightly.  She may continue every 4 hours as needed dosing between scheduled doses.  Monitor for the need to increase.  -Outpatient Neurosurgery follow up in 3-6 weeks    Severe Hypokalemia  Hypomagnesemia  -continues on supplement.   -BMP reviewed today with K+ of 4.4.   -Mg+ WNL.      Hypocalcemia  -Ionized calcium low  -received IV Calcium gluconate during hospitalization.   -Continue calcium carbonate.     Malnutrition  -Due to poor po intake  -Continue Marinol for appetite  -RD consult    New Atrial fibrillation  -EKG with new onset A.fib  -Metoprolol 50 mg  -No AC due to recurrent falls.      Multiple myeloma  Pancytopenia  -PET scan on 4/2/2024 revealed no evidence of relapse or progression of multiple myeloma and no significant change compared to PET scan performed on 3/23/2022 PET/CT.  -Continues on  Lenalidomide (off 1 week since 08/25) and Weekly Decadron at home  -Followed by [facility]     Polyneuropathy  -PTA pregabalin     Hypertension  -PTA metoprolol succinate 50 mg daily       Active Ambulatory Problems     Diagnosis Date Noted     Repeated falls 06/21/2023     PVC's (premature ventricular contractions) 11/20/2017     Multiple myeloma (H) 05/13/2013     Mixed hyperlipidemia 03/08/2009     Drug-induced polyneuropathy (H24) 12/12/2017     Drug-induced neutropenia (H24) 01/10/2017     Displacement of cervical intervertebral disc without myelopathy 03/07/2007     Fall, initial encounter 08/25/2024     Scalp laceration, initial encounter 08/25/2024     Closed compression fracture of L2 lumbar vertebra, initial encounter (H) 08/25/2024     Weakness 08/26/2024     Acute midline low back pain without sciatica 08/26/2024     Age-related osteoporosis without current pathological fracture 08/14/2020     Anemia 10/12/2023     Abnormal weight loss 09/09/2021     Numbness of  foot 09/09/2014     Musculoskeletal pain 05/11/2023     Iron deficiency anemia 08/06/2024     Impaired fasting glucose 10/19/2016     History of skin cancer 11/28/2019     Esophageal web determined by endoscopy 05/28/2021     Enthesopathy of hip region 03/07/2007     Ecchymosis 06/21/2023     Degeneration of lumbar or lumbosacral intervertebral disc 03/07/2007     Coronary artery disease involving native coronary artery of native heart without angina pectoris 08/15/2007     Pain in left hip 04/13/2017     Presence of left artificial knee joint 05/11/2023     Primary osteoarthritis of left knee 11/06/2018     Rash 06/12/2019     Tumor lysis syndrome 07/28/2013     Noninfectious gastroenteritis 12/22/2009     Diverticular disease of colon 12/22/2009     Resolved Ambulatory Problems     Diagnosis Date Noted     No Resolved Ambulatory Problems     Past Medical History:   Diagnosis Date     ASCVD (arteriosclerotic cardiovascular disease)      Chronic osteoarthritis      DJD (degenerative joint disease), lumbar      Hyperlipidemia      Malnutrition (H24)      No Known Allergies    All Meds and Allergies reviewed in the record at the facility and is the most up-to-date.    Current Outpatient Medications   Medication Sig Dispense Refill     aspirin 81 MG EC tablet Take 81 mg by mouth daily.       calcium carbonate (OS-ESTELA) 500 MG tablet Take 1 tablet by mouth daily.       cyanocobalamin (VITAMIN B-12) 500 MCG tablet Take 500 mcg by mouth daily.       dexAMETHasone (DECADRON) 4 MG tablet Take 20 mg by mouth once a week. On Saturdays       droNABinol (MARINOL) 2.5 MG capsule TAKE 1 CAPSULE BY MOUTH TWICE DAILY 60 capsule 0     ferrous sulfate 325 (65 FE) MG tablet Take 325 mg by mouth daily (with breakfast).       HYDROmorphone (DILAUDID) 2 MG tablet Take 1 tablet (2 mg) by mouth every 4 hours as needed for breakthrough pain or moderate to severe pain. (Patient taking differently: Take 2 mg by mouth every 4 hours as needed  for breakthrough pain or moderate to severe pain. Schedule 2 mg in the a.m. and 2 mg at at bedtime.  Patient may also continue every 4 hours as needed between her scheduled doses.) 24 tablet 0     LENalidomide (REVLIMID) 10 MG CAPS capsule Take 10 mg by mouth daily For 21 days, then 1 week off       magnesium oxide (MAG-OX) 400 MG tablet Take 1 tablet (400 mg) by mouth daily for 7 days.       metoprolol succinate ER (TOPROL XL) 50 MG 24 hr tablet Take 50 mg by mouth daily.       multivitamin, therapeutic (THERA-VIT) TABS tablet Take 1 tablet by mouth daily.       pantoprazole (PROTONIX) 40 MG EC tablet Take 1 tablet (40 mg) by mouth every morning (before breakfast).       potassium chloride ER (MICRO-K) 10 MEQ CR capsule Take 1 capsule (10 mEq) by mouth 2 times daily for 7 days.       pregabalin (LYRICA) 50 MG capsule TAKE 1 CAPSULE BY MOUTH TWICE DAILY 62 capsule 0     senna-docusate (SENOKOT-S/PERICOLACE) 8.6-50 MG tablet Take 1 tablet by mouth 2 times daily as needed for constipation.       simvastatin (ZOCOR) 10 MG tablet Take 10 mg by mouth at bedtime.       thiamine (B-1) 100 MG tablet Take 1 tablet (100 mg) by mouth daily for 10 days.       No current facility-administered medications for this visit.       REVIEW OF SYSTEMS:   10 point review of systems reviewed and pertinent positives in the HPI.     PHYSICAL EXAMINATION:  Physical Exam     Vital signs: BP 91/47   Pulse 82   Temp 98.7  F (37.1  C)   Resp 16   Wt 45.3 kg (99 lb 12.8 oz)   SpO2 99%   BMI 18.25 kg/m    General: Awake, Alert, oriented x1, lying in bed, follows simple commands  HEENT: Pink conjunctiva, dry oral mucosa  NECK: Supple  CVS:  S1  S2, without murmur or gallop.   LUNG: Clear to auscultation, No wheezes, rales or rhonci.  BACK: No outpouching. No redness or warmth.   ABDOMEN: Soft, thin, non tender to palpation, with positive bowel sounds  EXTREMITIES: Moves both upper and lower extremities with diffuse weakness, no pedal edema,  no calf tenderness  SKIN: Warm and dry  NEUROLOGIC: Intact, pulses palpable  PSYCHIATRIC: Cognitive impairment noted.       Labs:  All labs reviewed in the nursing home record and Epic   @  Lab Results   Component Value Date    WBC 3.3 09/03/2024    WBC 5.8 06/13/2013     Lab Results   Component Value Date    RBC 2.90 09/03/2024    RBC 3.33 06/13/2013     Lab Results   Component Value Date    HGB 8.9 09/03/2024    HGB 10.2 06/13/2013     Lab Results   Component Value Date    HCT 28.8 09/03/2024    HCT 32.0 06/13/2013     Lab Results   Component Value Date    MCV 99 09/03/2024    MCV 96 06/13/2013     Lab Results   Component Value Date    MCH 30.7 09/03/2024    MCH 30.6 06/13/2013     Lab Results   Component Value Date    MCHC 30.9 09/03/2024    MCHC 31.9 06/13/2013     Lab Results   Component Value Date    RDW 16.1 09/03/2024    RDW 12.6 06/13/2013     Lab Results   Component Value Date     09/03/2024     06/13/2013        @Last Comprehensive Metabolic Panel:  Sodium   Date Value Ref Range Status   09/03/2024 140 135 - 145 mmol/L Final   06/13/2013 140 133 - 144 mmol/L Final     Potassium   Date Value Ref Range Status   09/03/2024 4.4 3.4 - 5.3 mmol/L Final   06/13/2013 4.2 3.4 - 5.3 mmol/L Final     Chloride   Date Value Ref Range Status   09/03/2024 104 98 - 107 mmol/L Final   06/13/2013 99 94 - 109 mmol/L Final     Carbon Dioxide   Date Value Ref Range Status   06/13/2013 30 20 - 32 mmol/L Final     Carbon Dioxide (CO2)   Date Value Ref Range Status   09/03/2024 27 22 - 29 mmol/L Final     Anion Gap   Date Value Ref Range Status   09/03/2024 9 7 - 15 mmol/L Final   06/13/2013 12 6 - 17 mmol/L Final     Glucose   Date Value Ref Range Status   09/03/2024 82 70 - 99 mg/dL Final   06/13/2013 113 (H) 60 - 99 mg/dL Final     GLUCOSE BY METER POCT   Date Value Ref Range Status   08/29/2024 79 70 - 99 mg/dL Final     Urea Nitrogen   Date Value Ref Range Status   09/03/2024 19.7 8.0 - 23.0 mg/dL Final    06/13/2013 36 (H) 7 - 30 mg/dL Final     Creatinine   Date Value Ref Range Status   09/03/2024 0.89 0.51 - 0.95 mg/dL Final   06/13/2013 1.02 0.52 - 1.04 mg/dL Final     GFR Estimate   Date Value Ref Range Status   09/03/2024 63 >60 mL/min/1.73m2 Final   06/13/2013 53 (L) >60 mL/min/1.7m2 Final     Calcium   Date Value Ref Range Status   09/03/2024 8.9 8.8 - 10.4 mg/dL Final     Comment:     Reference intervals for this test were updated on 7/16/2024 to reflect our healthy population more accurately. There may be differences in the flagging of prior results with similar values performed with this method. Those prior results can be interpreted in the context of the updated reference intervals.   06/13/2013 10.1 8.5 - 10.4 mg/dL Final       > 45 minutes spent preparing for this visit which included reviewing hospital records, labs, imaging consults and face to face time spent with pt and collaborating with nursing staff.     This note has been dictated using voice recognition software. Any grammatical or context distortions are unintentional and inherent to the software    Electronically signed by: Margaux Juarez CNP       Sincerely,        Margaux Juarez NP

## 2024-09-06 NOTE — PROGRESS NOTES
[facility] GERIATRIC SERVICES    Code Status:  FULL CODE   Visit Type:   Chief Complaint   Patient presents with    Tcu Follow Up     Facility:  [facility] ([facility]) [[id number]]         HPI: Jacquelin Sampson is a 84 year old female who I am seeing today for follow up on the TCU. Past medical history includes recurrent falls, severe L2-L3 stenosis, hypocalcemia, malnutrition, MM, HTN, HL and polyneuropathy. Pt with recurrent falls. Denies syncopal event, seizures or loss of consciousness.Severe DJD of the spine. Pt found to have acute L2 compression fracture. Pt treated conservatively with splinting. Pain management with Dilaudid and Lyrica. Occipital laceration. New onset of A fib on metoprolol. No AC due to recurrent falls. Malnutrition.       Transitional Care Course: Today pt lying in bed.  Patient's daughter is present on exam.  Patient lying in bed with her TLSO splinting on.  She continues to complain of pain in her back greater while laying in bed.  I did review with both patient and her daughter that her TLSO splint may be contributing to her pain while lying in bed.  Splint should be on out of bed.  Daughter is asking for better control of her pain.  Patient continues on as needed Dilaudid every 4 hours and Lyrica twice daily.  We talked about scheduling her pain medication.  Look back in the records patient has taken Dilaudid 2-4 times daily.  She also continues on Tylenol.  Patient with poor oral intake.  Weight down to 97 pounds.  She continues on Marinol for appetite support.  She denies any nausea or vomiting.  No pain in her abdomen.  She reports she is having regular bowel movement and emptying her bladder.  A-fib well-controlled metoprolol.    Assessment/Plan:     L2 compression fracture  Recurrent falls  Severe L2-L3 stenosis  -Recurrent falls   -MRI showed  L2 compression fracture  -TLSO brace when OOB.  Avoid use while lying in bed.  This may be contributory to increased pain.  -Continue  tylenol, Lyrica.  Scheduled Dilaudid 2 mg in the AM and nightly.  She may continue every 4 hours as needed dosing between scheduled doses.  Monitor for the need to increase.  -Outpatient Neurosurgery follow up in 3-6 weeks    Severe Hypokalemia  Hypomagnesemia  -continues on supplement.   -BMP reviewed today with K+ of 4.4.   -Mg+ WNL.      Hypocalcemia  -Ionized calcium low  -received IV Calcium gluconate during hospitalization.   -Continue calcium carbonate.     Malnutrition  -Due to poor po intake  -Continue Marinol for appetite  -RD consult    New Atrial fibrillation  -EKG with new onset A.fib  -Metoprolol 50 mg  -No AC due to recurrent falls.      Multiple myeloma  Pancytopenia  -PET scan on 4/2/2024 revealed no evidence of relapse or progression of multiple myeloma and no significant change compared to PET scan performed on 3/23/2022 PET/CT.  -Continues on  Lenalidomide (off 1 week since 08/25) and Weekly Decadron at home  -Followed by [facility]     Polyneuropathy  -PTA pregabalin     Hypertension  -PTA metoprolol succinate 50 mg daily       Active Ambulatory Problems     Diagnosis Date Noted    Repeated falls 06/21/2023    PVC's (premature ventricular contractions) 11/20/2017    Multiple myeloma (H) 05/13/2013    Mixed hyperlipidemia 03/08/2009    Drug-induced polyneuropathy (H24) 12/12/2017    Drug-induced neutropenia (H24) 01/10/2017    Displacement of cervical intervertebral disc without myelopathy 03/07/2007    Fall, initial encounter 08/25/2024    Scalp laceration, initial encounter 08/25/2024    Closed compression fracture of L2 lumbar vertebra, initial encounter (H) 08/25/2024    Weakness 08/26/2024    Acute midline low back pain without sciatica 08/26/2024    Age-related osteoporosis without current pathological fracture 08/14/2020    Anemia 10/12/2023    Abnormal weight loss 09/09/2021    Numbness of foot 09/09/2014    Musculoskeletal pain 05/11/2023    Iron deficiency anemia 08/06/2024    Impaired  fasting glucose 10/19/2016    History of skin cancer 11/28/2019    Esophageal web determined by endoscopy 05/28/2021    Enthesopathy of hip region 03/07/2007    Ecchymosis 06/21/2023    Degeneration of lumbar or lumbosacral intervertebral disc 03/07/2007    Coronary artery disease involving native coronary artery of native heart without angina pectoris 08/15/2007    Pain in left hip 04/13/2017    Presence of left artificial knee joint 05/11/2023    Primary osteoarthritis of left knee 11/06/2018    Rash 06/12/2019    Tumor lysis syndrome 07/28/2013    Noninfectious gastroenteritis 12/22/2009    Diverticular disease of colon 12/22/2009     Resolved Ambulatory Problems     Diagnosis Date Noted    No Resolved Ambulatory Problems     Past Medical History:   Diagnosis Date    ASCVD (arteriosclerotic cardiovascular disease)     Chronic osteoarthritis     DJD (degenerative joint disease), lumbar     Hyperlipidemia     Malnutrition (H24)      No Known Allergies    All Meds and Allergies reviewed in the record at the facility and is the most up-to-date.    Current Outpatient Medications   Medication Sig Dispense Refill    aspirin 81 MG EC tablet Take 81 mg by mouth daily.      calcium carbonate (OS-ESTELA) 500 MG tablet Take 1 tablet by mouth daily.      cyanocobalamin (VITAMIN B-12) 500 MCG tablet Take 500 mcg by mouth daily.      dexAMETHasone (DECADRON) 4 MG tablet Take 20 mg by mouth once a week. On Saturdays      droNABinol (MARINOL) 2.5 MG capsule TAKE 1 CAPSULE BY MOUTH TWICE DAILY 60 capsule 0    ferrous sulfate 325 (65 FE) MG tablet Take 325 mg by mouth daily (with breakfast).      HYDROmorphone (DILAUDID) 2 MG tablet Take 1 tablet (2 mg) by mouth every 4 hours as needed for breakthrough pain or moderate to severe pain. (Patient taking differently: Take 2 mg by mouth every 4 hours as needed for breakthrough pain or moderate to severe pain. Schedule 2 mg in the a.m. and 2 mg at at bedtime.  Patient may also continue  every 4 hours as needed between her scheduled doses.) 24 tablet 0    LENalidomide (REVLIMID) 10 MG CAPS capsule Take 10 mg by mouth daily For 21 days, then 1 week off      magnesium oxide (MAG-OX) 400 MG tablet Take 1 tablet (400 mg) by mouth daily for 7 days.      metoprolol succinate ER (TOPROL XL) 50 MG 24 hr tablet Take 50 mg by mouth daily.      multivitamin, therapeutic (THERA-VIT) TABS tablet Take 1 tablet by mouth daily.      pantoprazole (PROTONIX) 40 MG EC tablet Take 1 tablet (40 mg) by mouth every morning (before breakfast).      potassium chloride ER (MICRO-K) 10 MEQ CR capsule Take 1 capsule (10 mEq) by mouth 2 times daily for 7 days.      pregabalin (LYRICA) 50 MG capsule TAKE 1 CAPSULE BY MOUTH TWICE DAILY 62 capsule 0    senna-docusate (SENOKOT-S/PERICOLACE) 8.6-50 MG tablet Take 1 tablet by mouth 2 times daily as needed for constipation.      simvastatin (ZOCOR) 10 MG tablet Take 10 mg by mouth at bedtime.      thiamine (B-1) 100 MG tablet Take 1 tablet (100 mg) by mouth daily for 10 days.       No current facility-administered medications for this visit.       REVIEW OF SYSTEMS:   10 point review of systems reviewed and pertinent positives in the HPI.     PHYSICAL EXAMINATION:  Physical Exam     Vital signs: BP 91/47   Pulse 82   Temp 98.7  F (37.1  C)   Resp 16   Wt 45.3 kg (99 lb 12.8 oz)   SpO2 99%   BMI 18.25 kg/m    General: Awake, Alert, oriented x1, lying in bed, follows simple commands  HEENT: Pink conjunctiva, dry oral mucosa  NECK: Supple  CVS:  S1  S2, without murmur or gallop.   LUNG: Clear to auscultation, No wheezes, rales or rhonci.  BACK: No outpouching. No redness or warmth.   ABDOMEN: Soft, thin, non tender to palpation, with positive bowel sounds  EXTREMITIES: Moves both upper and lower extremities with diffuse weakness, no pedal edema, no calf tenderness  SKIN: Warm and dry  NEUROLOGIC: Intact, pulses palpable  PSYCHIATRIC: Cognitive impairment noted.       Labs:  All  labs reviewed in the nursing home record and Epic   @  Lab Results   Component Value Date    WBC 3.3 09/03/2024    WBC 5.8 06/13/2013     Lab Results   Component Value Date    RBC 2.90 09/03/2024    RBC 3.33 06/13/2013     Lab Results   Component Value Date    HGB 8.9 09/03/2024    HGB 10.2 06/13/2013     Lab Results   Component Value Date    HCT 28.8 09/03/2024    HCT 32.0 06/13/2013     Lab Results   Component Value Date    MCV 99 09/03/2024    MCV 96 06/13/2013     Lab Results   Component Value Date    MCH 30.7 09/03/2024    MCH 30.6 06/13/2013     Lab Results   Component Value Date    MCHC 30.9 09/03/2024    MCHC 31.9 06/13/2013     Lab Results   Component Value Date    RDW 16.1 09/03/2024    RDW 12.6 06/13/2013     Lab Results   Component Value Date     09/03/2024     06/13/2013        @Last Comprehensive Metabolic Panel:  Sodium   Date Value Ref Range Status   09/03/2024 140 135 - 145 mmol/L Final   06/13/2013 140 133 - 144 mmol/L Final     Potassium   Date Value Ref Range Status   09/03/2024 4.4 3.4 - 5.3 mmol/L Final   06/13/2013 4.2 3.4 - 5.3 mmol/L Final     Chloride   Date Value Ref Range Status   09/03/2024 104 98 - 107 mmol/L Final   06/13/2013 99 94 - 109 mmol/L Final     Carbon Dioxide   Date Value Ref Range Status   06/13/2013 30 20 - 32 mmol/L Final     Carbon Dioxide (CO2)   Date Value Ref Range Status   09/03/2024 27 22 - 29 mmol/L Final     Anion Gap   Date Value Ref Range Status   09/03/2024 9 7 - 15 mmol/L Final   06/13/2013 12 6 - 17 mmol/L Final     Glucose   Date Value Ref Range Status   09/03/2024 82 70 - 99 mg/dL Final   06/13/2013 113 (H) 60 - 99 mg/dL Final     GLUCOSE BY METER POCT   Date Value Ref Range Status   08/29/2024 79 70 - 99 mg/dL Final     Urea Nitrogen   Date Value Ref Range Status   09/03/2024 19.7 8.0 - 23.0 mg/dL Final   06/13/2013 36 (H) 7 - 30 mg/dL Final     Creatinine   Date Value Ref Range Status   09/03/2024 0.89 0.51 - 0.95 mg/dL Final   06/13/2013  1.02 0.52 - 1.04 mg/dL Final     GFR Estimate   Date Value Ref Range Status   09/03/2024 63 >60 mL/min/1.73m2 Final   06/13/2013 53 (L) >60 mL/min/1.7m2 Final     Calcium   Date Value Ref Range Status   09/03/2024 8.9 8.8 - 10.4 mg/dL Final     Comment:     Reference intervals for this test were updated on 7/16/2024 to reflect our healthy population more accurately. There may be differences in the flagging of prior results with similar values performed with this method. Those prior results can be interpreted in the context of the updated reference intervals.   06/13/2013 10.1 8.5 - 10.4 mg/dL Final       > 45 minutes spent preparing for this visit which included reviewing hospital records, labs, imaging consults and face to face time spent with pt and collaborating with nursing staff.     This note has been dictated using voice recognition software. Any grammatical or context distortions are unintentional and inherent to the software    Electronically signed by: Margaux Juarez CNP

## 2024-09-09 NOTE — LETTER
" 9/9/2024      Jacquelin Sampson  [address]  [facility] [address]        [facility]    Code Status:  FULL CODE   Visit Type:   Chief Complaint   Patient presents with     TCU Follow Up     Facility:  [facility] ([facility]) [[id number]]         HPI: Jacquelin Sampson is a 84 year old female who I am seeing today for follow up on the TCU. Past medical history includes recurrent falls, severe L2-L3 stenosis, hypocalcemia, malnutrition, MM, HTN, HL and polyneuropathy. Pt with recurrent falls. Denies syncopal event, seizures or loss of consciousness.Severe DJD of the spine. Pt found to have acute L2 compression fracture. Pt treated conservatively with splinting. Pain management with Dilaudid and Lyrica. Occipital laceration. New onset of A fib on metoprolol. No AC due to recurrent falls. Malnutrition.       Transitional Care Course: Today pt lying in bed.  Patient's daughter and  are present on exam with multiple questions.  They are concerned of patient's \" ups and downs.\"  When asked for clarification they feel patient continues to be in a great deal of pain.  On my last visit her other daughter was present and we did discuss pain management.  Her Dilaudid at that time was scheduled 2 times daily with every 4 hours as needed.  Patient also continues on Lyrica twice daily.  Family also feels patient has been very depressed/anxious.  Patient with very flat affect.  She continues in TLSO splinting when out of bed.  Patient continues to report pain in her back.  No radiculopathy.  She dislikes the brace.  It is riding up on today's visit.  I did speak with her and her family about having orthotics come out and just the brace and cut down area under the neck.  Patient with ongoing malnutrition.  She has had about a 7 pound weight loss since admit.  She is being followed by the dietitian.  She is currently on NDS.  She is also on Marinol for appetite support.  This is not new.  After my " visit the daughter did accompany out of the room and question patient's cognitive status.  A look back in the record shows a BM score of 3 out of 15.  Family was starting to see some decline at home.  Family concerned patient may have a UTI which is causing increased confusion.  We did discuss medication side effects which can be confusion.  Patient denies any dysuria frequency burning or pain.  She reports she is having regular bowel movements.      Assessment/Plan:     L2 compression fracture  Recurrent falls  Severe L2-L3 stenosis  -Recurrent falls   -MRI showed  L2 compression fracture  -TLSO brace when OOB.    -Continue tylenol, Lyrica.  Increase Dilaudid Dilaudid 2 mg every 6 hours.  -Outpatient Neurosurgery follow up in 3-6 weeks  -Orthotics to come and cut down brace around the neck and apply sheepskin.    Severe Hypokalemia  Hypomagnesemia  -continues on supplement.   -Repeat CBC and BMP in the AM.  -Mg+ WNL.     Cognitive Impairment   -BIMS 3/15.   -Pain medication could be exacerbating symptoms however family wants pain well controlled.   -Rule out infection. Obtain UA/UC. Obtain CBC, BMP.   -I do noted a PCP note from 7/11 with mini cog score of ) indicating dementia.     Malnutrition  -Due to poor po intake  -Continue Marinol for appetite  -RD consult    New Atrial fibrillation  -EKG with new onset A.fib  -Metoprolol 50 mg  -No AC due to recurrent falls.      Multiple myeloma  Pancytopenia  -PET scan on 4/2/2024 revealed no evidence of relapse or progression of multiple myeloma and no significant change compared to PET scan performed on 3/23/2022 PET/CT.  -Continues on  Lenalidomide (off since 08/25)   -Weekly Decadron at home.    -Followed by [facility]     Polyneuropathy  -PTA pregabalin     Hypertension  -PTA metoprolol succinate 50 mg daily       Active Ambulatory Problems     Diagnosis Date Noted     Repeated falls 06/21/2023     PVC's (premature ventricular contractions) 11/20/2017     Multiple  myeloma (H) 05/13/2013     Mixed hyperlipidemia 03/08/2009     Drug-induced polyneuropathy (H24) 12/12/2017     Drug-induced neutropenia (H24) 01/10/2017     Displacement of cervical intervertebral disc without myelopathy 03/07/2007     Fall, initial encounter 08/25/2024     Scalp laceration, initial encounter 08/25/2024     Closed compression fracture of L2 lumbar vertebra, initial encounter (H) 08/25/2024     Weakness 08/26/2024     Acute midline low back pain without sciatica 08/26/2024     Age-related osteoporosis without current pathological fracture 08/14/2020     Anemia 10/12/2023     Abnormal weight loss 09/09/2021     Numbness of foot 09/09/2014     Musculoskeletal pain 05/11/2023     Iron deficiency anemia 08/06/2024     Impaired fasting glucose 10/19/2016     History of skin cancer 11/28/2019     Esophageal web determined by endoscopy 05/28/2021     Enthesopathy of hip region 03/07/2007     Ecchymosis 06/21/2023     Degeneration of lumbar or lumbosacral intervertebral disc 03/07/2007     Coronary artery disease involving native coronary artery of native heart without angina pectoris 08/15/2007     Pain in left hip 04/13/2017     Presence of left artificial knee joint 05/11/2023     Primary osteoarthritis of left knee 11/06/2018     Rash 06/12/2019     Tumor lysis syndrome 07/28/2013     Noninfectious gastroenteritis 12/22/2009     Diverticular disease of colon 12/22/2009     Resolved Ambulatory Problems     Diagnosis Date Noted     No Resolved Ambulatory Problems     Past Medical History:   Diagnosis Date     ASCVD (arteriosclerotic cardiovascular disease)      Chronic osteoarthritis      DJD (degenerative joint disease), lumbar      Hyperlipidemia      Malnutrition (H24)      No Known Allergies    All Meds and Allergies reviewed in the record at the facility and is the most up-to-date.    Current Outpatient Medications   Medication Sig Dispense Refill     aspirin 81 MG EC tablet Take 81 mg by mouth  "daily.       calcium carbonate (OS-ESTELA) 500 MG tablet Take 1 tablet by mouth daily.       cyanocobalamin (VITAMIN B-12) 500 MCG tablet Take 500 mcg by mouth daily.       dexAMETHasone (DECADRON) 4 MG tablet Take 20 mg by mouth once a week. On Saturdays       droNABinol (MARINOL) 2.5 MG capsule TAKE 1 CAPSULE BY MOUTH TWICE DAILY 60 capsule 0     ferrous sulfate 325 (65 FE) MG tablet Take 325 mg by mouth daily (with breakfast).       HYDROmorphone (DILAUDID) 2 MG tablet Take 1 tablet (2 mg) by mouth every 4 hours as needed for breakthrough pain or moderate to severe pain. (Patient taking differently: Take 2 mg by mouth every 6 hours. .) 24 tablet 0     LENalidomide (REVLIMID) 10 MG CAPS capsule Take 10 mg by mouth daily For 21 days, then 1 week off       metoprolol succinate ER (TOPROL XL) 50 MG 24 hr tablet Take 50 mg by mouth daily.       multivitamin, therapeutic (THERA-VIT) TABS tablet Take 1 tablet by mouth daily.       pantoprazole (PROTONIX) 40 MG EC tablet Take 1 tablet (40 mg) by mouth every morning (before breakfast).       pregabalin (LYRICA) 50 MG capsule TAKE 1 CAPSULE BY MOUTH TWICE DAILY 62 capsule 0     senna-docusate (SENOKOT-S/PERICOLACE) 8.6-50 MG tablet Take 1 tablet by mouth 2 times daily as needed for constipation.       simvastatin (ZOCOR) 10 MG tablet Take 10 mg by mouth at bedtime.       thiamine (B-1) 100 MG tablet Take 1 tablet (100 mg) by mouth daily for 10 days.       No current facility-administered medications for this visit.       REVIEW OF SYSTEMS:   10 point review of systems reviewed and pertinent positives in the HPI.     PHYSICAL EXAMINATION:  Physical Exam     Vital signs: /65   Pulse 58   Temp 98  F (36.7  C)   Resp 16   Ht 1.575 m (5' 2\")   Wt 46.8 kg (103 lb 3.2 oz)   SpO2 99%   BMI 18.88 kg/m    General: Awake, Alert, oriented x2 sitting up in bedside chair, follows simple commands at times.   HEENT: Pink conjunctiva, dry oral mucosa  NECK: Supple  CVS:  S1  S2, " without murmur or gallop.   LUNG: Clear to auscultation, No wheezes, rales or rhonci.  BACK: TLSO splinting on.    ABDOMEN: Soft, thin, non tender to palpation, with positive bowel sounds  EXTREMITIES: Moves both upper and lower extremities with diffuse weakness, no pedal edema, no calf tenderness  SKIN: Warm and dry  NEUROLOGIC: Intact, pulses palpable  PSYCHIATRIC: Cognitive impairment noted. Flat affect. BIMS 3/15.     Labs:  All labs reviewed in the nursing home record and Epic   @  Lab Results   Component Value Date    WBC 3.3 09/03/2024    WBC 5.8 06/13/2013     Lab Results   Component Value Date    RBC 2.90 09/03/2024    RBC 3.33 06/13/2013     Lab Results   Component Value Date    HGB 8.9 09/03/2024    HGB 10.2 06/13/2013     Lab Results   Component Value Date    HCT 28.8 09/03/2024    HCT 32.0 06/13/2013     Lab Results   Component Value Date    MCV 99 09/03/2024    MCV 96 06/13/2013     Lab Results   Component Value Date    MCH 30.7 09/03/2024    MCH 30.6 06/13/2013     Lab Results   Component Value Date    MCHC 30.9 09/03/2024    MCHC 31.9 06/13/2013     Lab Results   Component Value Date    RDW 16.1 09/03/2024    RDW 12.6 06/13/2013     Lab Results   Component Value Date     09/03/2024     06/13/2013        @Last Comprehensive Metabolic Panel:  Sodium   Date Value Ref Range Status   09/03/2024 140 135 - 145 mmol/L Final   06/13/2013 140 133 - 144 mmol/L Final     Potassium   Date Value Ref Range Status   09/03/2024 4.4 3.4 - 5.3 mmol/L Final   06/13/2013 4.2 3.4 - 5.3 mmol/L Final     Chloride   Date Value Ref Range Status   09/03/2024 104 98 - 107 mmol/L Final   06/13/2013 99 94 - 109 mmol/L Final     Carbon Dioxide   Date Value Ref Range Status   06/13/2013 30 20 - 32 mmol/L Final     Carbon Dioxide (CO2)   Date Value Ref Range Status   09/03/2024 27 22 - 29 mmol/L Final     Anion Gap   Date Value Ref Range Status   09/03/2024 9 7 - 15 mmol/L Final   06/13/2013 12 6 - 17 mmol/L Final      Glucose   Date Value Ref Range Status   09/03/2024 82 70 - 99 mg/dL Final   06/13/2013 113 (H) 60 - 99 mg/dL Final     GLUCOSE BY METER POCT   Date Value Ref Range Status   08/29/2024 79 70 - 99 mg/dL Final     Urea Nitrogen   Date Value Ref Range Status   09/03/2024 19.7 8.0 - 23.0 mg/dL Final   06/13/2013 36 (H) 7 - 30 mg/dL Final     Creatinine   Date Value Ref Range Status   09/03/2024 0.89 0.51 - 0.95 mg/dL Final   06/13/2013 1.02 0.52 - 1.04 mg/dL Final     GFR Estimate   Date Value Ref Range Status   09/03/2024 63 >60 mL/min/1.73m2 Final   06/13/2013 53 (L) >60 mL/min/1.7m2 Final     Calcium   Date Value Ref Range Status   09/03/2024 8.9 8.8 - 10.4 mg/dL Final     Comment:     Reference intervals for this test were updated on 7/16/2024 to reflect our healthy population more accurately. There may be differences in the flagging of prior results with similar values performed with this method. Those prior results can be interpreted in the context of the updated reference intervals.   06/13/2013 10.1 8.5 - 10.4 mg/dL Final       > 45 minutes spent answering questions from daughter and , spent face to face with pt, reviewing plan of care and follow ups.     This note has been dictated using voice recognition software. Any grammatical or context distortions are unintentional and inherent to the software    Electronically signed by: Margaux Juarez CNP       Sincerely,        Margaux Juarez NP

## 2024-09-10 NOTE — PROGRESS NOTES
"[facility]    Code Status:  FULL CODE   Visit Type:   Chief Complaint   Patient presents with    TCU Follow Up     Facility:  [facility] ([facility]) [[id number]]         HPI: Jacquelin Sampson is a 84 year old female who I am seeing today for follow up on the TCU. Past medical history includes recurrent falls, severe L2-L3 stenosis, hypocalcemia, malnutrition, MM, HTN, HL and polyneuropathy. Pt with recurrent falls. Denies syncopal event, seizures or loss of consciousness.Severe DJD of the spine. Pt found to have acute L2 compression fracture. Pt treated conservatively with splinting. Pain management with Dilaudid and Lyrica. Occipital laceration. New onset of A fib on metoprolol. No AC due to recurrent falls. Malnutrition.       Transitional Care Course: Today pt lying in bed.  Patient's daughter and  are present on exam with multiple questions.  They are concerned of patient's \" ups and downs.\"  When asked for clarification they feel patient continues to be in a great deal of pain.  On my last visit her other daughter was present and we did discuss pain management.  Her Dilaudid at that time was scheduled 2 times daily with every 4 hours as needed.  Patient also continues on Lyrica twice daily.  Family also feels patient has been very depressed/anxious.  Patient with very flat affect.  She continues in TLSO splinting when out of bed.  Patient continues to report pain in her back.  No radiculopathy.  She dislikes the brace.  It is riding up on today's visit.  I did speak with her and her family about having orthotics come out and just the brace and cut down area under the neck.  Patient with ongoing malnutrition.  She has had about a 7 pound weight loss since admit.  She is being followed by the dietitian.  She is currently on NDS.  She is also on Marinol for appetite support.  This is not new.  After my visit the daughter did accompany out of the room and question patient's cognitive " status.  A look back in the record shows a BM score of 3 out of 15.  Family was starting to see some decline at home.  Family concerned patient may have a UTI which is causing increased confusion.  We did discuss medication side effects which can be confusion.  Patient denies any dysuria frequency burning or pain.  She reports she is having regular bowel movements.      Assessment/Plan:     L2 compression fracture  Recurrent falls  Severe L2-L3 stenosis  -Recurrent falls   -MRI showed  L2 compression fracture  -TLSO brace when OOB.    -Continue tylenol, Lyrica.  Increase Dilaudid Dilaudid 2 mg every 6 hours.  -Outpatient Neurosurgery follow up in 3-6 weeks  -Orthotics to come and cut down brace around the neck and apply sheepskin.    Severe Hypokalemia  Hypomagnesemia  -continues on supplement.   -Repeat CBC and BMP in the AM.  -Mg+ WNL.     Cognitive Impairment   -BIMS 3/15.   -Pain medication could be exacerbating symptoms however family wants pain well controlled.   -Rule out infection. Obtain UA/UC. Obtain CBC, BMP.   -I do noted a PCP note from 7/11 with mini cog score of ) indicating dementia.     Malnutrition  -Due to poor po intake  -Continue Marinol for appetite  -RD consult    New Atrial fibrillation  -EKG with new onset A.fib  -Metoprolol 50 mg  -No AC due to recurrent falls.      Multiple myeloma  Pancytopenia  -PET scan on 4/2/2024 revealed no evidence of relapse or progression of multiple myeloma and no significant change compared to PET scan performed on 3/23/2022 PET/CT.  -Continues on  Lenalidomide (off since 08/25)   -Weekly Decadron at home.    -[facility]     Polyneuropathy  -PTA pregabalin     Hypertension  -PTA metoprolol succinate 50 mg daily       Active Ambulatory Problems     Diagnosis Date Noted    Repeated falls 06/21/2023    PVC's (premature ventricular contractions) 11/20/2017    Multiple myeloma (H) 05/13/2013    Mixed hyperlipidemia 03/08/2009    Drug-induced  polyneuropathy (H24) 12/12/2017    Drug-induced neutropenia (H24) 01/10/2017    Displacement of cervical intervertebral disc without myelopathy 03/07/2007    Fall, initial encounter 08/25/2024    Scalp laceration, initial encounter 08/25/2024    Closed compression fracture of L2 lumbar vertebra, initial encounter (H) 08/25/2024    Weakness 08/26/2024    Acute midline low back pain without sciatica 08/26/2024    Age-related osteoporosis without current pathological fracture 08/14/2020    Anemia 10/12/2023    Abnormal weight loss 09/09/2021    Numbness of foot 09/09/2014    Musculoskeletal pain 05/11/2023    Iron deficiency anemia 08/06/2024    Impaired fasting glucose 10/19/2016    History of skin cancer 11/28/2019    Esophageal web determined by endoscopy 05/28/2021    Enthesopathy of hip region 03/07/2007    Ecchymosis 06/21/2023    Degeneration of lumbar or lumbosacral intervertebral disc 03/07/2007    Coronary artery disease involving native coronary artery of native heart without angina pectoris 08/15/2007    Pain in left hip 04/13/2017    Presence of left artificial knee joint 05/11/2023    Primary osteoarthritis of left knee 11/06/2018    Rash 06/12/2019    Tumor lysis syndrome 07/28/2013    Noninfectious gastroenteritis 12/22/2009    Diverticular disease of colon 12/22/2009     Resolved Ambulatory Problems     Diagnosis Date Noted    No Resolved Ambulatory Problems     Past Medical History:   Diagnosis Date    ASCVD (arteriosclerotic cardiovascular disease)     Chronic osteoarthritis     DJD (degenerative joint disease), lumbar     Hyperlipidemia     Malnutrition (H24)      No Known Allergies    All Meds and Allergies reviewed in the record at the facility and is the most up-to-date.    Current Outpatient Medications   Medication Sig Dispense Refill    aspirin 81 MG EC tablet Take 81 mg by mouth daily.      calcium carbonate (OS-ESTELA) 500 MG tablet Take 1 tablet by mouth daily.      cyanocobalamin (VITAMIN  "B-12) 500 MCG tablet Take 500 mcg by mouth daily.      dexAMETHasone (DECADRON) 4 MG tablet Take 20 mg by mouth once a week. On Saturdays      droNABinol (MARINOL) 2.5 MG capsule TAKE 1 CAPSULE BY MOUTH TWICE DAILY 60 capsule 0    ferrous sulfate 325 (65 FE) MG tablet Take 325 mg by mouth daily (with breakfast).      HYDROmorphone (DILAUDID) 2 MG tablet Take 1 tablet (2 mg) by mouth every 4 hours as needed for breakthrough pain or moderate to severe pain. (Patient taking differently: Take 2 mg by mouth every 6 hours. .) 24 tablet 0    LENalidomide (REVLIMID) 10 MG CAPS capsule Take 10 mg by mouth daily For 21 days, then 1 week off      metoprolol succinate ER (TOPROL XL) 50 MG 24 hr tablet Take 50 mg by mouth daily.      multivitamin, therapeutic (THERA-VIT) TABS tablet Take 1 tablet by mouth daily.      pantoprazole (PROTONIX) 40 MG EC tablet Take 1 tablet (40 mg) by mouth every morning (before breakfast).      pregabalin (LYRICA) 50 MG capsule TAKE 1 CAPSULE BY MOUTH TWICE DAILY 62 capsule 0    senna-docusate (SENOKOT-S/PERICOLACE) 8.6-50 MG tablet Take 1 tablet by mouth 2 times daily as needed for constipation.      simvastatin (ZOCOR) 10 MG tablet Take 10 mg by mouth at bedtime.      thiamine (B-1) 100 MG tablet Take 1 tablet (100 mg) by mouth daily for 10 days.       No current facility-administered medications for this visit.       REVIEW OF SYSTEMS:   10 point review of systems reviewed and pertinent positives in the HPI.     PHYSICAL EXAMINATION:  Physical Exam     Vital signs: /65   Pulse 58   Temp 98  F (36.7  C)   Resp 16   Ht 1.575 m (5' 2\")   Wt 46.8 kg (103 lb 3.2 oz)   SpO2 99%   BMI 18.88 kg/m    General: Awake, Alert, oriented x2 sitting up in bedside chair, follows simple commands at times.   HEENT: Pink conjunctiva, dry oral mucosa  NECK: Supple  CVS:  S1  S2, without murmur or gallop.   LUNG: Clear to auscultation, No wheezes, rales or rhonci.  BACK: TLSO splinting on.    ABDOMEN: " Soft, thin, non tender to palpation, with positive bowel sounds  EXTREMITIES: Moves both upper and lower extremities with diffuse weakness, no pedal edema, no calf tenderness  SKIN: Warm and dry  NEUROLOGIC: Intact, pulses palpable  PSYCHIATRIC: Cognitive impairment noted. Flat affect. BIMS 3/15.     Labs:  All labs reviewed in the nursing home record and Epic   @  Lab Results   Component Value Date    WBC 3.3 09/03/2024    WBC 5.8 06/13/2013     Lab Results   Component Value Date    RBC 2.90 09/03/2024    RBC 3.33 06/13/2013     Lab Results   Component Value Date    HGB 8.9 09/03/2024    HGB 10.2 06/13/2013     Lab Results   Component Value Date    HCT 28.8 09/03/2024    HCT 32.0 06/13/2013     Lab Results   Component Value Date    MCV 99 09/03/2024    MCV 96 06/13/2013     Lab Results   Component Value Date    MCH 30.7 09/03/2024    MCH 30.6 06/13/2013     Lab Results   Component Value Date    MCHC 30.9 09/03/2024    MCHC 31.9 06/13/2013     Lab Results   Component Value Date    RDW 16.1 09/03/2024    RDW 12.6 06/13/2013     Lab Results   Component Value Date     09/03/2024     06/13/2013        @Last Comprehensive Metabolic Panel:  Sodium   Date Value Ref Range Status   09/03/2024 140 135 - 145 mmol/L Final   06/13/2013 140 133 - 144 mmol/L Final     Potassium   Date Value Ref Range Status   09/03/2024 4.4 3.4 - 5.3 mmol/L Final   06/13/2013 4.2 3.4 - 5.3 mmol/L Final     Chloride   Date Value Ref Range Status   09/03/2024 104 98 - 107 mmol/L Final   06/13/2013 99 94 - 109 mmol/L Final     Carbon Dioxide   Date Value Ref Range Status   06/13/2013 30 20 - 32 mmol/L Final     Carbon Dioxide (CO2)   Date Value Ref Range Status   09/03/2024 27 22 - 29 mmol/L Final     Anion Gap   Date Value Ref Range Status   09/03/2024 9 7 - 15 mmol/L Final   06/13/2013 12 6 - 17 mmol/L Final     Glucose   Date Value Ref Range Status   09/03/2024 82 70 - 99 mg/dL Final   06/13/2013 113 (H) 60 - 99 mg/dL Final      GLUCOSE BY METER POCT   Date Value Ref Range Status   08/29/2024 79 70 - 99 mg/dL Final     Urea Nitrogen   Date Value Ref Range Status   09/03/2024 19.7 8.0 - 23.0 mg/dL Final   06/13/2013 36 (H) 7 - 30 mg/dL Final     Creatinine   Date Value Ref Range Status   09/03/2024 0.89 0.51 - 0.95 mg/dL Final   06/13/2013 1.02 0.52 - 1.04 mg/dL Final     GFR Estimate   Date Value Ref Range Status   09/03/2024 63 >60 mL/min/1.73m2 Final   06/13/2013 53 (L) >60 mL/min/1.7m2 Final     Calcium   Date Value Ref Range Status   09/03/2024 8.9 8.8 - 10.4 mg/dL Final     Comment:     Reference intervals for this test were updated on 7/16/2024 to reflect our healthy population more accurately. There may be differences in the flagging of prior results with similar values performed with this method. Those prior results can be interpreted in the context of the updated reference intervals.   06/13/2013 10.1 8.5 - 10.4 mg/dL Final       > 45 minutes spent answering questions from daughter and , spent face to face with pt, reviewing plan of care and follow ups.     This note has been dictated using voice recognition software. Any grammatical or context distortions are unintentional and inherent to the software    Electronically signed by: Margaux Juarez CNP

## 2024-09-11 NOTE — PROGRESS NOTES
[facility]    Code Status:  FULL CODE   Visit Type:   Chief Complaint   Patient presents with    TCU Follow Up     Facility:  [facility] ([facility]) [[id number]]         HPI: Jacquelin Sampson is a 84 year old female who I am seeing today for follow up on the TCU. Past medical history includes recurrent falls, severe L2-L3 stenosis, hypocalcemia, malnutrition, MM, HTN, HL and polyneuropathy. Pt with recurrent falls. Denies syncopal event, seizures or loss of consciousness.Severe DJD of the spine. Pt found to have acute L2 compression fracture. Pt treated conservatively with splinting. Pain management with Dilaudid and Lyrica. Occipital laceration. New onset of A fib on metoprolol. No AC due to recurrent falls. Malnutrition.       Transitional Care Course: Today pt assisted into bed.  at bedside. Spinal stenosis with neuropathy, s/p L2 compression fractures. Pt continues on Dilaudid. She continues in TLSO splinting. Pt continues to report pain. She continues on Dilaudid, tylenol, Lyrica and methocarbamol. Pt with failure to thrive like symptoms. Appetite continues to be poor with weight loss. She is Marinol for appetite stimulant. Underlying multiple myeloma. She continues on oral chemo. Cognitive impairment. BIMS 3/15. Today I talked with her . Family wishes to pursue LTC with hospice. They want pt kept comfortable. They have a Care Conference in am. Recent work up unrevealing for infection. UA/UC negative. CBC and BMP unremarkable.      Assessment/Plan:     L2 compression fracture  Recurrent falls  Severe L2-L3 stenosis  -Recurrent falls   -MRI showed  L2 compression fracture  -TLSO brace when OOB.    -Continue tylenol, Lyrica.  Increase Dilaudid Dilaudid 2 mg every 6 hours.  -Outpatient Neurosurgery follow up in 3-6 weeks    Failure to thrive  -continue weight loss.   -Slow progress in therapy.   -Most likely multi factorial given cognitive impairment, MM and poor oral intake with  recurrent falls.   -Family would like to pursue LTC with hospice.     Cognitive Impairment   -BIMS 3/15.   -Pain medication could be exacerbating symptoms however family wants pain well controlled.   -Rule out infection. Work up unremarkable. UA/UC negative. CBC and BMP unremarkable.   -I do noted a PCP note from 7/11 with mini cog score of ) indicating dementia.     Malnutrition  -Due to poor po intake  -Continue Marinol for appetite  -RD consult    New Atrial fibrillation  -EKG with new onset A.fib  -Metoprolol 50 mg  -No AC due to recurrent falls.      Multiple myeloma  Pancytopenia  -PET scan on 4/2/2024 revealed no evidence of relapse or progression of multiple myeloma and no significant change compared to PET scan performed on 3/23/2022 PET/CT.  -Continues on  Lenalidomide (off since 08/25)   -Weekly Decadron at home.    -Followed by [facility]     Polyneuropathy  -PTA pregabalin     Hypertension  -PTA metoprolol succinate 50 mg daily       Active Ambulatory Problems     Diagnosis Date Noted    Repeated falls 06/21/2023    PVC's (premature ventricular contractions) 11/20/2017    Multiple myeloma (H) 05/13/2013    Mixed hyperlipidemia 03/08/2009    Drug-induced polyneuropathy (H24) 12/12/2017    Drug-induced neutropenia (H24) 01/10/2017    Displacement of cervical intervertebral disc without myelopathy 03/07/2007    Fall, initial encounter 08/25/2024    Scalp laceration, initial encounter 08/25/2024    Closed compression fracture of L2 lumbar vertebra, initial encounter (H) 08/25/2024    Weakness 08/26/2024    Acute midline low back pain without sciatica 08/26/2024    Age-related osteoporosis without current pathological fracture 08/14/2020    Anemia 10/12/2023    Abnormal weight loss 09/09/2021    Numbness of foot 09/09/2014    Musculoskeletal pain 05/11/2023    Iron deficiency anemia 08/06/2024    Impaired fasting glucose 10/19/2016    History of skin cancer 11/28/2019    Esophageal web determined by  endoscopy 05/28/2021    Enthesopathy of hip region 03/07/2007    Ecchymosis 06/21/2023    Degeneration of lumbar or lumbosacral intervertebral disc 03/07/2007    Coronary artery disease involving native coronary artery of native heart without angina pectoris 08/15/2007    Pain in left hip 04/13/2017    Presence of left artificial knee joint 05/11/2023    Primary osteoarthritis of left knee 11/06/2018    Rash 06/12/2019    Tumor lysis syndrome 07/28/2013    Noninfectious gastroenteritis 12/22/2009    Diverticular disease of colon 12/22/2009     Resolved Ambulatory Problems     Diagnosis Date Noted    No Resolved Ambulatory Problems     Past Medical History:   Diagnosis Date    ASCVD (arteriosclerotic cardiovascular disease)     Chronic osteoarthritis     DJD (degenerative joint disease), lumbar     Hyperlipidemia     Malnutrition (H24)      No Known Allergies    All Meds and Allergies reviewed in the record at the facility and is the most up-to-date.    Current Outpatient Medications   Medication Sig Dispense Refill    aspirin 81 MG EC tablet Take 81 mg by mouth daily.      calcium carbonate (OS-ESTEAL) 500 MG tablet Take 1 tablet by mouth daily.      cyanocobalamin (VITAMIN B-12) 500 MCG tablet Take 500 mcg by mouth daily.      dexAMETHasone (DECADRON) 4 MG tablet Take 20 mg by mouth once a week. On Saturdays      droNABinol (MARINOL) 2.5 MG capsule TAKE 1 CAPSULE BY MOUTH TWICE DAILY 60 capsule 0    ferrous sulfate 325 (65 FE) MG tablet Take 325 mg by mouth daily (with breakfast).      HYDROmorphone (DILAUDID) 2 MG tablet TAKE 1 TABLET BY MOUTH EVERY 4 HOURS AS NEEDED 30 tablet 0    LENalidomide (REVLIMID) 10 MG CAPS capsule Take 10 mg by mouth daily For 21 days, then 1 week off      metoprolol succinate ER (TOPROL XL) 50 MG 24 hr tablet Take 50 mg by mouth daily.      multivitamin, therapeutic (THERA-VIT) TABS tablet Take 1 tablet by mouth daily.      pantoprazole (PROTONIX) 40 MG EC tablet Take 1 tablet (40 mg) by  "mouth every morning (before breakfast).      pregabalin (LYRICA) 50 MG capsule TAKE 1 CAPSULE BY MOUTH TWICE DAILY 62 capsule 0    senna-docusate (SENOKOT-S/PERICOLACE) 8.6-50 MG tablet Take 1 tablet by mouth 2 times daily as needed for constipation.      simvastatin (ZOCOR) 10 MG tablet Take 10 mg by mouth at bedtime.       No current facility-administered medications for this visit.       REVIEW OF SYSTEMS:   10 point review of systems reviewed and pertinent positives in the HPI.     PHYSICAL EXAMINATION:  Physical Exam     Vital signs: /64   Pulse 63   Temp 97.2  F (36.2  C)   Resp 16   Ht 1.575 m (5' 2\")   Wt 46.8 kg (103 lb 3.2 oz)   SpO2 99%   BMI 18.88 kg/m    General: Awake, Alert, oriented x2 sitting up in wheelchair, assisted into bed, follows simple commands at times.   HEENT: Pink conjunctiva, dry oral mucosa  NECK: Supple  CVS:  S1  S2, without murmur or gallop.   LUNG: Clear to auscultation, No wheezes, rales or rhonci.  BACK: TLSO splinting on.    ABDOMEN: Soft, thin, non tender to palpation, with positive bowel sounds  EXTREMITIES: Moves both upper and lower extremities with diffuse weakness, no pedal edema, no calf tenderness  SKIN: Warm and dry  NEUROLOGIC: Intact, pulses palpable  PSYCHIATRIC: Cognitive impairment noted. Flat affect. BIMS 3/15.     Labs:  All labs reviewed in the nursing home record and Epic   @  Lab Results   Component Value Date    WBC 3.3 09/03/2024    WBC 5.8 06/13/2013     Lab Results   Component Value Date    RBC 2.90 09/03/2024    RBC 3.33 06/13/2013     Lab Results   Component Value Date    HGB 8.9 09/03/2024    HGB 10.2 06/13/2013     Lab Results   Component Value Date    HCT 28.8 09/03/2024    HCT 32.0 06/13/2013     Lab Results   Component Value Date    MCV 99 09/03/2024    MCV 96 06/13/2013     Lab Results   Component Value Date    MCH 30.7 09/03/2024    MCH 30.6 06/13/2013     Lab Results   Component Value Date    MCHC 30.9 09/03/2024    MCHC 31.9 " 06/13/2013     Lab Results   Component Value Date    RDW 16.1 09/03/2024    RDW 12.6 06/13/2013     Lab Results   Component Value Date     09/03/2024     06/13/2013        @Last Comprehensive Metabolic Panel:  Sodium   Date Value Ref Range Status   09/10/2024 142 135 - 145 mmol/L Final   06/13/2013 140 133 - 144 mmol/L Final     Potassium   Date Value Ref Range Status   09/10/2024 4.0 3.4 - 5.3 mmol/L Final   06/13/2013 4.2 3.4 - 5.3 mmol/L Final     Chloride   Date Value Ref Range Status   09/10/2024 108 (H) 98 - 107 mmol/L Final   06/13/2013 99 94 - 109 mmol/L Final     Carbon Dioxide   Date Value Ref Range Status   06/13/2013 30 20 - 32 mmol/L Final     Carbon Dioxide (CO2)   Date Value Ref Range Status   09/10/2024 25 22 - 29 mmol/L Final     Anion Gap   Date Value Ref Range Status   09/10/2024 9 7 - 15 mmol/L Final   06/13/2013 12 6 - 17 mmol/L Final     Glucose   Date Value Ref Range Status   09/10/2024 85 70 - 99 mg/dL Final   06/13/2013 113 (H) 60 - 99 mg/dL Final     GLUCOSE BY METER POCT   Date Value Ref Range Status   08/29/2024 79 70 - 99 mg/dL Final     Urea Nitrogen   Date Value Ref Range Status   09/10/2024 28.9 (H) 8.0 - 23.0 mg/dL Final   06/13/2013 36 (H) 7 - 30 mg/dL Final     Creatinine   Date Value Ref Range Status   09/10/2024 1.03 (H) 0.51 - 0.95 mg/dL Final   06/13/2013 1.02 0.52 - 1.04 mg/dL Final     GFR Estimate   Date Value Ref Range Status   09/10/2024 53 (L) >60 mL/min/1.73m2 Final   06/13/2013 53 (L) >60 mL/min/1.7m2 Final     Calcium   Date Value Ref Range Status   09/10/2024 9.0 8.8 - 10.4 mg/dL Final     Comment:     Reference intervals for this test were updated on 7/16/2024 to reflect our healthy population more accurately. There may be differences in the flagging of prior results with similar values performed with this method. Those prior results can be interpreted in the context of the updated reference intervals.   06/13/2013 10.1 8.5 - 10.4 mg/dL Final     This  note has been dictated using voice recognition software. Any grammatical or context distortions are unintentional and inherent to the software    Electronically signed by: Margaux Juarez CNP

## 2024-09-11 NOTE — LETTER
9/11/2024      Jacquelin Sampson  [id number]  [place] [id number]        [facility]    Code Status:  FULL CODE   Visit Type:   Chief Complaint   Patient presents with     TCU Follow Up     Facility:  [facility] ([facility]) [[id number]]         HPI: Jacquelin Sampson is a 84 year old female who I am seeing today for follow up on the TCU. Past medical history includes recurrent falls, severe L2-L3 stenosis, hypocalcemia, malnutrition, MM, HTN, HL and polyneuropathy. Pt with recurrent falls. Denies syncopal event, seizures or loss of consciousness.Severe DJD of the spine. Pt found to have acute L2 compression fracture. Pt treated conservatively with splinting. Pain management with Dilaudid and Lyrica. Occipital laceration. New onset of A fib on metoprolol. No AC due to recurrent falls. Malnutrition.       Transitional Care Course: Today pt assisted into bed.  at bedside. Spinal stenosis with neuropathy, s/p L2 compression fractures. Pt continues on Dilaudid. She continues in TLSO splinting. Pt continues to report pain. She continues on Dilaudid, tylenol, Lyrica and methocarbamol. Pt with failure to thrive like symptoms. Appetite continues to be poor with weight loss. She is Marinol for appetite stimulant. Underlying multiple myeloma. She continues on oral chemo. Cognitive impairment. BIMS 3/15. Today I talked with her . Family wishes to pursue LTC with hospice. They want pt kept comfortable. They have a Care Conference in am. Recent work up unrevealing for infection. UA/UC negative. CBC and BMP unremarkable.      Assessment/Plan:     L2 compression fracture  Recurrent falls  Severe L2-L3 stenosis  -Recurrent falls   -MRI showed  L2 compression fracture  -TLSO brace when OOB.    -Continue tylenol, Lyrica.  Increase Dilaudid Dilaudid 2 mg every 6 hours.  -Outpatient Neurosurgery follow up in 3-6 weeks    Failure to thrive  -continue weight loss.   -Slow progress in therapy.    -Most likely multi factorial given cognitive impairment, MM and poor oral intake with recurrent falls.   -Family would like to pursue LTC with hospice.     Cognitive Impairment   -BIMS 3/15.   -Pain medication could be exacerbating symptoms however family wants pain well controlled.   -Rule out infection. Work up unremarkable. UA/UC negative. CBC and BMP unremarkable.   -I do noted a PCP note from 7/11 with mini cog score of ) indicating dementia.     Malnutrition  -Due to poor po intake  -Continue Marinol for appetite  -RD consult    New Atrial fibrillation  -EKG with new onset A.fib  -Metoprolol 50 mg  -No AC due to recurrent falls.      Multiple myeloma  Pancytopenia  -PET scan on 4/2/2024 revealed no evidence of relapse or progression of multiple myeloma and no significant change compared to PET scan performed on 3/23/2022 PET/CT.  -Continues on  Lenalidomide (off since 08/25)   -Weekly Decadron at home.    -Followed by [facility]     Polyneuropathy  -PTA pregabalin     Hypertension  -PTA metoprolol succinate 50 mg daily       Active Ambulatory Problems     Diagnosis Date Noted     Repeated falls 06/21/2023     PVC's (premature ventricular contractions) 11/20/2017     Multiple myeloma (H) 05/13/2013     Mixed hyperlipidemia 03/08/2009     Drug-induced polyneuropathy (H24) 12/12/2017     Drug-induced neutropenia (H24) 01/10/2017     Displacement of cervical intervertebral disc without myelopathy 03/07/2007     Fall, initial encounter 08/25/2024     Scalp laceration, initial encounter 08/25/2024     Closed compression fracture of L2 lumbar vertebra, initial encounter (H) 08/25/2024     Weakness 08/26/2024     Acute midline low back pain without sciatica 08/26/2024     Age-related osteoporosis without current pathological fracture 08/14/2020     Anemia 10/12/2023     Abnormal weight loss 09/09/2021     Numbness of foot 09/09/2014     Musculoskeletal pain 05/11/2023     Iron deficiency anemia 08/06/2024      Impaired fasting glucose 10/19/2016     History of skin cancer 11/28/2019     Esophageal web determined by endoscopy 05/28/2021     Enthesopathy of hip region 03/07/2007     Ecchymosis 06/21/2023     Degeneration of lumbar or lumbosacral intervertebral disc 03/07/2007     Coronary artery disease involving native coronary artery of native heart without angina pectoris 08/15/2007     Pain in left hip 04/13/2017     Presence of left artificial knee joint 05/11/2023     Primary osteoarthritis of left knee 11/06/2018     Rash 06/12/2019     Tumor lysis syndrome 07/28/2013     Noninfectious gastroenteritis 12/22/2009     Diverticular disease of colon 12/22/2009     Resolved Ambulatory Problems     Diagnosis Date Noted     No Resolved Ambulatory Problems     Past Medical History:   Diagnosis Date     ASCVD (arteriosclerotic cardiovascular disease)      Chronic osteoarthritis      DJD (degenerative joint disease), lumbar      Hyperlipidemia      Malnutrition (H24)      No Known Allergies    All Meds and Allergies reviewed in the record at the facility and is the most up-to-date.    Current Outpatient Medications   Medication Sig Dispense Refill     aspirin 81 MG EC tablet Take 81 mg by mouth daily.       calcium carbonate (OS-ESTELA) 500 MG tablet Take 1 tablet by mouth daily.       cyanocobalamin (VITAMIN B-12) 500 MCG tablet Take 500 mcg by mouth daily.       dexAMETHasone (DECADRON) 4 MG tablet Take 20 mg by mouth once a week. On Saturdays       droNABinol (MARINOL) 2.5 MG capsule TAKE 1 CAPSULE BY MOUTH TWICE DAILY 60 capsule 0     ferrous sulfate 325 (65 FE) MG tablet Take 325 mg by mouth daily (with breakfast).       HYDROmorphone (DILAUDID) 2 MG tablet TAKE 1 TABLET BY MOUTH EVERY 4 HOURS AS NEEDED 30 tablet 0     LENalidomide (REVLIMID) 10 MG CAPS capsule Take 10 mg by mouth daily For 21 days, then 1 week off       metoprolol succinate ER (TOPROL XL) 50 MG 24 hr tablet Take 50 mg by mouth daily.       multivitamin,  "therapeutic (THERA-VIT) TABS tablet Take 1 tablet by mouth daily.       pantoprazole (PROTONIX) 40 MG EC tablet Take 1 tablet (40 mg) by mouth every morning (before breakfast).       pregabalin (LYRICA) 50 MG capsule TAKE 1 CAPSULE BY MOUTH TWICE DAILY 62 capsule 0     senna-docusate (SENOKOT-S/PERICOLACE) 8.6-50 MG tablet Take 1 tablet by mouth 2 times daily as needed for constipation.       simvastatin (ZOCOR) 10 MG tablet Take 10 mg by mouth at bedtime.       No current facility-administered medications for this visit.       REVIEW OF SYSTEMS:   10 point review of systems reviewed and pertinent positives in the HPI.     PHYSICAL EXAMINATION:  Physical Exam     Vital signs: /64   Pulse 63   Temp 97.2  F (36.2  C)   Resp 16   Ht 1.575 m (5' 2\")   Wt 46.8 kg (103 lb 3.2 oz)   SpO2 99%   BMI 18.88 kg/m    General: Awake, Alert, oriented x2 sitting up in wheelchair, assisted into bed, follows simple commands at times.   HEENT: Pink conjunctiva, dry oral mucosa  NECK: Supple  CVS:  S1  S2, without murmur or gallop.   LUNG: Clear to auscultation, No wheezes, rales or rhonci.  BACK: TLSO splinting on.    ABDOMEN: Soft, thin, non tender to palpation, with positive bowel sounds  EXTREMITIES: Moves both upper and lower extremities with diffuse weakness, no pedal edema, no calf tenderness  SKIN: Warm and dry  NEUROLOGIC: Intact, pulses palpable  PSYCHIATRIC: Cognitive impairment noted. Flat affect. BIMS 3/15.     Labs:  All labs reviewed in the nursing home record and [facility]   @  Lab Results   Component Value Date    WBC 3.3 09/03/2024    WBC 5.8 06/13/2013     Lab Results   Component Value Date    RBC 2.90 09/03/2024    RBC 3.33 06/13/2013     Lab Results   Component Value Date    HGB 8.9 09/03/2024    HGB 10.2 06/13/2013     Lab Results   Component Value Date    HCT 28.8 09/03/2024    HCT 32.0 06/13/2013     Lab Results   Component Value Date    MCV 99 09/03/2024    MCV 96 06/13/2013     Lab Results   Component " Value Date    MCH 30.7 09/03/2024    MCH 30.6 06/13/2013     Lab Results   Component Value Date    MCHC 30.9 09/03/2024    MCHC 31.9 06/13/2013     Lab Results   Component Value Date    RDW 16.1 09/03/2024    RDW 12.6 06/13/2013     Lab Results   Component Value Date     09/03/2024     06/13/2013        @Last Comprehensive Metabolic Panel:  Sodium   Date Value Ref Range Status   09/10/2024 142 135 - 145 mmol/L Final   06/13/2013 140 133 - 144 mmol/L Final     Potassium   Date Value Ref Range Status   09/10/2024 4.0 3.4 - 5.3 mmol/L Final   06/13/2013 4.2 3.4 - 5.3 mmol/L Final     Chloride   Date Value Ref Range Status   09/10/2024 108 (H) 98 - 107 mmol/L Final   06/13/2013 99 94 - 109 mmol/L Final     Carbon Dioxide   Date Value Ref Range Status   06/13/2013 30 20 - 32 mmol/L Final     Carbon Dioxide (CO2)   Date Value Ref Range Status   09/10/2024 25 22 - 29 mmol/L Final     Anion Gap   Date Value Ref Range Status   09/10/2024 9 7 - 15 mmol/L Final   06/13/2013 12 6 - 17 mmol/L Final     Glucose   Date Value Ref Range Status   09/10/2024 85 70 - 99 mg/dL Final   06/13/2013 113 (H) 60 - 99 mg/dL Final     GLUCOSE BY METER POCT   Date Value Ref Range Status   08/29/2024 79 70 - 99 mg/dL Final     Urea Nitrogen   Date Value Ref Range Status   09/10/2024 28.9 (H) 8.0 - 23.0 mg/dL Final   06/13/2013 36 (H) 7 - 30 mg/dL Final     Creatinine   Date Value Ref Range Status   09/10/2024 1.03 (H) 0.51 - 0.95 mg/dL Final   06/13/2013 1.02 0.52 - 1.04 mg/dL Final     GFR Estimate   Date Value Ref Range Status   09/10/2024 53 (L) >60 mL/min/1.73m2 Final   06/13/2013 53 (L) >60 mL/min/1.7m2 Final     Calcium   Date Value Ref Range Status   09/10/2024 9.0 8.8 - 10.4 mg/dL Final     Comment:     Reference intervals for this test were updated on 7/16/2024 to reflect our healthy population more accurately. There may be differences in the flagging of prior results with similar values performed with this method. Those  prior results can be interpreted in the context of the updated reference intervals.   06/13/2013 10.1 8.5 - 10.4 mg/dL Final     This note has been dictated using voice recognition software. Any grammatical or context distortions are unintentional and inherent to the software    Electronically signed by: Margaux Juarez CNP       Sincerely,        Margaux Juarez, NP

## 2024-09-17 NOTE — LETTER
9/17/2024      Jacquelin Sampson  [id number]  [facility] [id number]        [facility]    Code Status:  FULL CODE   Visit Type:   Chief Complaint   Patient presents with     TCU Follow Up     Facility:  [facility] ([facility]) [[id number]]         HPI: Jacquelin Sampson is a 84 year old female who I am seeing today for follow up on the TCU. Past medical history includes recurrent falls, severe L2-L3 stenosis, hypocalcemia, malnutrition, MM, HTN, HL and polyneuropathy. Pt with recurrent falls. Denies syncopal event, seizures or loss of consciousness.Severe DJD of the spine. Pt found to have acute L2 compression fracture. Pt treated conservatively with splinting. Pain management with Dilaudid and Lyrica. Occipital laceration. New onset of A fib on metoprolol. No AC due to recurrent falls. Malnutrition.       Transitional Care Course: Today pt sitting up in wheelchair. Her  and daughter present. Hx of spinal stenosis with neuropathy, s/p L2 compression fractures. Pt continues in TLSO. Pain controlled with dilaudid and Lyrica. Pt with low bp this am with SBP in the 90s. Pt asymptomatic. Her metoprolol was held. New dx of A fib. She denies any CP or palpitations. Fluids have been encouraged. Pt continues with poor appetite. She is on Marinol. Cognitive impairment. BIMS 3/15. Pt with underlying MM. Pt with failure to thrive symptoms. Plans to move to LTC however part of family would like for pt to come back home.  has reported he is unable to caregive 24 hours a day.        Assessment/Plan:     L2 compression fracture  Recurrent falls  Severe L2-L3 stenosis  -Recurrent falls   -MRI showed  L2 compression fracture  -TLSO brace when OOB.    -Continue tylenol, Lyrica.  Increase Dilaudid Dilaudid 2 mg every 6 hours.  -Outpatient Neurosurgery follow up in 4 weeks    Failure to thrive  -continued weight loss.   -Slow progress in therapy.   -Most likely multi factorial given cognitive  impairment, MM and poor oral intake with recurrent falls.     Cognitive Impairment   -BIMS 3/15.   -Pain medication could be exacerbating symptoms however family wants pain well controlled.   -Rule out infection. Work up unremarkable. UA/UC negative. CBC and BMP unremarkable.   -I do noted a PCP note from 7/11 with mini cog score of ) indicating dementia.     Malnutrition  -Due to poor po intake  -Continue Marinol for appetite  -RD consult    New Atrial fibrillation  Hypotension  -EKG with new onset A.fib  -Metoprolol 50 mg. Hold Metoprolol today. Encourage fluids.   -No AC due to recurrent falls.      Multiple myeloma  Pancytopenia  -PET scan on 4/2/2024 revealed no evidence of relapse or progression of multiple myeloma and no significant change compared to PET scan performed on 3/23/2022 PET/CT.  -Continues on  Lenalidomide (off since 08/25)   -Weekly Decadron at home.    -Followed by [facility]     Polyneuropathy  -PTA pregabalin     Hypertension  -PTA metoprolol succinate 50 mg daily. Held today due to low bp.   -Encourage fluids.     -ok to discharge to long term care with current meds and treatments.     Active Ambulatory Problems     Diagnosis Date Noted     Repeated falls 06/21/2023     PVC's (premature ventricular contractions) 11/20/2017     Multiple myeloma (H) 05/13/2013     Mixed hyperlipidemia 03/08/2009     Drug-induced polyneuropathy (H24) 12/12/2017     Drug-induced neutropenia (H24) 01/10/2017     Displacement of cervical intervertebral disc without myelopathy 03/07/2007     Fall, initial encounter 08/25/2024     Scalp laceration, initial encounter 08/25/2024     Closed compression fracture of L2 lumbar vertebra, initial encounter (H) 08/25/2024     Weakness 08/26/2024     Acute midline low back pain without sciatica 08/26/2024     Age-related osteoporosis without current pathological fracture 08/14/2020     Anemia 10/12/2023     Abnormal weight loss 09/09/2021     Numbness of foot 09/09/2014      Musculoskeletal pain 05/11/2023     Iron deficiency anemia 08/06/2024     Impaired fasting glucose 10/19/2016     History of skin cancer 11/28/2019     Esophageal web determined by endoscopy 05/28/2021     Enthesopathy of hip region 03/07/2007     Ecchymosis 06/21/2023     Degeneration of lumbar or lumbosacral intervertebral disc 03/07/2007     Coronary artery disease involving native coronary artery of native heart without angina pectoris 08/15/2007     Pain in left hip 04/13/2017     Presence of left artificial knee joint 05/11/2023     Primary osteoarthritis of left knee 11/06/2018     Rash 06/12/2019     Tumor lysis syndrome 07/28/2013     Noninfectious gastroenteritis 12/22/2009     Diverticular disease of colon 12/22/2009     Resolved Ambulatory Problems     Diagnosis Date Noted     No Resolved Ambulatory Problems     Past Medical History:   Diagnosis Date     ASCVD (arteriosclerotic cardiovascular disease)      Chronic osteoarthritis      DJD (degenerative joint disease), lumbar      Hyperlipidemia      Malnutrition (H24)      No Known Allergies    All Meds and Allergies reviewed in the record at the facility and is the most up-to-date.    Current Outpatient Medications   Medication Sig Dispense Refill     aspirin 81 MG EC tablet Take 81 mg by mouth daily.       calcium carbonate (OS-ESTELA) 500 MG tablet Take 1 tablet by mouth daily.       cyanocobalamin (VITAMIN B-12) 500 MCG tablet Take 500 mcg by mouth daily.       dexAMETHasone (DECADRON) 4 MG tablet Take 20 mg by mouth once a week. On Saturdays       droNABinol (MARINOL) 2.5 MG capsule TAKE 1 CAPSULE BY MOUTH TWICE DAILY 60 capsule 0     ferrous sulfate 325 (65 FE) MG tablet Take 325 mg by mouth daily (with breakfast).       HYDROmorphone (DILAUDID) 2 MG tablet Take 1 tablet (2 mg) by mouth every 4 hours as needed for severe pain. 30 tablet 0     LENalidomide (REVLIMID) 10 MG CAPS capsule Take 10 mg by mouth daily For 21 days, then 1 week off        "metoprolol succinate ER (TOPROL XL) 50 MG 24 hr tablet Take 50 mg by mouth daily.       multivitamin, therapeutic (THERA-VIT) TABS tablet Take 1 tablet by mouth daily.       pantoprazole (PROTONIX) 40 MG EC tablet Take 1 tablet (40 mg) by mouth every morning (before breakfast).       pregabalin (LYRICA) 50 MG capsule TAKE 1 CAPSULE BY MOUTH TWICE DAILY 62 capsule 0     senna-docusate (SENOKOT-S/PERICOLACE) 8.6-50 MG tablet Take 1 tablet by mouth 2 times daily as needed for constipation.       simvastatin (ZOCOR) 10 MG tablet Take 10 mg by mouth at bedtime.       No current facility-administered medications for this visit.       REVIEW OF SYSTEMS:   10 point review of systems reviewed and pertinent positives in the HPI.     PHYSICAL EXAMINATION:  Physical Exam     Vital signs: BP 94/55   Pulse 84   Temp 98.5  F (36.9  C)   Resp 16   Ht 1.575 m (5' 2\")   Wt 51.3 kg (113 lb)   SpO2 98%   BMI 20.67 kg/m    General: Awake, Alert, oriented x2 sitting up in wheelchair,  follows simple commands at times.  HEENT: Pink conjunctiva, dry oral mucosa  NECK: Supple  CVS:  S1  S2, without murmur or gallop.   LUNG: Clear to auscultation, No wheezes, rales or rhonci.  BACK: TLSO splinting on.    ABDOMEN: Soft, thin, non tender to palpation, with positive bowel sounds  EXTREMITIES: Moves both upper and lower extremities with diffuse weakness, no pedal edema, no calf tenderness  SKIN: Warm and dry  NEUROLOGIC: Intact, pulses palpable  PSYCHIATRIC: Cognitive impairment noted. Flat affect. BIMS 3/15.     Labs:  All labs reviewed in the nursing home record and [facility]   @  Lab Results   Component Value Date    WBC 3.3 09/03/2024    WBC 5.8 06/13/2013     Lab Results   Component Value Date    RBC 2.90 09/03/2024    RBC 3.33 06/13/2013     Lab Results   Component Value Date    HGB 8.9 09/03/2024    HGB 10.2 06/13/2013     Lab Results   Component Value Date    HCT 28.8 09/03/2024    HCT 32.0 06/13/2013     Lab Results   Component Value " Date    MCV 99 09/03/2024    MCV 96 06/13/2013     Lab Results   Component Value Date    MCH 30.7 09/03/2024    MCH 30.6 06/13/2013     Lab Results   Component Value Date    MCHC 30.9 09/03/2024    MCHC 31.9 06/13/2013     Lab Results   Component Value Date    RDW 16.1 09/03/2024    RDW 12.6 06/13/2013     Lab Results   Component Value Date     09/03/2024     06/13/2013        @Last Comprehensive Metabolic Panel:  Sodium   Date Value Ref Range Status   09/10/2024 142 135 - 145 mmol/L Final   06/13/2013 140 133 - 144 mmol/L Final     Potassium   Date Value Ref Range Status   09/10/2024 4.0 3.4 - 5.3 mmol/L Final   06/13/2013 4.2 3.4 - 5.3 mmol/L Final     Chloride   Date Value Ref Range Status   09/10/2024 108 (H) 98 - 107 mmol/L Final   06/13/2013 99 94 - 109 mmol/L Final     Carbon Dioxide   Date Value Ref Range Status   06/13/2013 30 20 - 32 mmol/L Final     Carbon Dioxide (CO2)   Date Value Ref Range Status   09/10/2024 25 22 - 29 mmol/L Final     Anion Gap   Date Value Ref Range Status   09/10/2024 9 7 - 15 mmol/L Final   06/13/2013 12 6 - 17 mmol/L Final     Glucose   Date Value Ref Range Status   09/10/2024 85 70 - 99 mg/dL Final   06/13/2013 113 (H) 60 - 99 mg/dL Final     GLUCOSE BY METER POCT   Date Value Ref Range Status   08/29/2024 79 70 - 99 mg/dL Final     Urea Nitrogen   Date Value Ref Range Status   09/10/2024 28.9 (H) 8.0 - 23.0 mg/dL Final   06/13/2013 36 (H) 7 - 30 mg/dL Final     Creatinine   Date Value Ref Range Status   09/10/2024 1.03 (H) 0.51 - 0.95 mg/dL Final   06/13/2013 1.02 0.52 - 1.04 mg/dL Final     GFR Estimate   Date Value Ref Range Status   09/10/2024 53 (L) >60 mL/min/1.73m2 Final   06/13/2013 53 (L) >60 mL/min/1.7m2 Final     Calcium   Date Value Ref Range Status   09/10/2024 9.0 8.8 - 10.4 mg/dL Final     Comment:     Reference intervals for this test were updated on 7/16/2024 to reflect our healthy population more accurately. There may be differences in the  flagging of prior results with similar values performed with this method. Those prior results can be interpreted in the context of the updated reference intervals.   06/13/2013 10.1 8.5 - 10.4 mg/dL Final       This note has been dictated using voice recognition software. Any grammatical or context distortions are unintentional and inherent to the software    Electronically signed by: Margaux Juarez CNP       Sincerely,        Margaux Juarez NP

## 2024-09-18 NOTE — PROGRESS NOTES
[facility]    Code Status:  FULL CODE   Visit Type:   Chief Complaint   Patient presents with    TCU Follow Up     Facility:  [facility] ([facility]) [[id number]]         HPI: Jacquelin Sampson is a 84 year old female who I am seeing today for follow up on the TCU. Past medical history includes recurrent falls, severe L2-L3 stenosis, hypocalcemia, malnutrition, MM, HTN, HL and polyneuropathy. Pt with recurrent falls. Denies syncopal event, seizures or loss of consciousness.Severe DJD of the spine. Pt found to have acute L2 compression fracture. Pt treated conservatively with splinting. Pain management with Dilaudid and Lyrica. Occipital laceration. New onset of A fib on metoprolol. No AC due to recurrent falls. Malnutrition.       Transitional Care Course: Today pt sitting up in wheelchair. Her  and daughter present. Hx of spinal stenosis with neuropathy, s/p L2 compression fractures. Pt continues in TLSO. Pain controlled with dilaudid and Lyrica. Pt with low bp this am with SBP in the 90s. Pt asymptomatic. Her metoprolol was held. New dx of A fib. She denies any CP or palpitations. Fluids have been encouraged. Pt continues with poor appetite. She is on Marinol. Cognitive impairment. BIMS 3/15. Pt with underlying MM. Pt with failure to thrive symptoms. Plans to move to LTC however part of family would like for pt to come back home.  has reported he is unable to caregive 24 hours a day.        Assessment/Plan:     L2 compression fracture  Recurrent falls  Severe L2-L3 stenosis  -Recurrent falls   -MRI showed  L2 compression fracture  -TLSO brace when OOB.    -Continue tylenol, Lyrica.  Increase Dilaudid Dilaudid 2 mg every 6 hours.  -Outpatient Neurosurgery follow up in 4 weeks    Failure to thrive  -continued weight loss.   -Slow progress in therapy.   -Most likely multi factorial given cognitive impairment, MM and poor oral intake with recurrent falls.     Cognitive Impairment   -BIMS  3/15.   -Pain medication could be exacerbating symptoms however family wants pain well controlled.   -Rule out infection. Work up unremarkable. UA/UC negative. CBC and BMP unremarkable.   -I do noted a PCP note from 7/11 with mini cog score of ) indicating dementia.     Malnutrition  -Due to poor po intake  -Continue Marinol for appetite  -RD consult    New Atrial fibrillation  Hypotension  -EKG with new onset A.fib  -Metoprolol 50 mg. Hold Metoprolol today. Encourage fluids.   -No AC due to recurrent falls.      Multiple myeloma  Pancytopenia  -PET scan on 4/2/2024 revealed no evidence of relapse or progression of multiple myeloma and no significant change compared to PET scan performed on 3/23/2022 PET/CT.  -Continues on  Lenalidomide (off since 08/25)   -Weekly Decadron at home.    -Followed by [facility]     Polyneuropathy  -PTA pregabalin     Hypertension  -PTA metoprolol succinate 50 mg daily. Held today due to low bp.   -Encourage fluids.     -ok to discharge to long term care with current meds and treatments.     Active Ambulatory Problems     Diagnosis Date Noted    Repeated falls 06/21/2023    PVC's (premature ventricular contractions) 11/20/2017    Multiple myeloma (H) 05/13/2013    Mixed hyperlipidemia 03/08/2009    Drug-induced polyneuropathy (H24) 12/12/2017    Drug-induced neutropenia (H24) 01/10/2017    Displacement of cervical intervertebral disc without myelopathy 03/07/2007    Fall, initial encounter 08/25/2024    Scalp laceration, initial encounter 08/25/2024    Closed compression fracture of L2 lumbar vertebra, initial encounter (H) 08/25/2024    Weakness 08/26/2024    Acute midline low back pain without sciatica 08/26/2024    Age-related osteoporosis without current pathological fracture 08/14/2020    Anemia 10/12/2023    Abnormal weight loss 09/09/2021    Numbness of foot 09/09/2014    Musculoskeletal pain 05/11/2023    Iron deficiency anemia 08/06/2024    Impaired fasting glucose 10/19/2016     History of skin cancer 11/28/2019    Esophageal web determined by endoscopy 05/28/2021    Enthesopathy of hip region 03/07/2007    Ecchymosis 06/21/2023    Degeneration of lumbar or lumbosacral intervertebral disc 03/07/2007    Coronary artery disease involving native coronary artery of native heart without angina pectoris 08/15/2007    Pain in left hip 04/13/2017    Presence of left artificial knee joint 05/11/2023    Primary osteoarthritis of left knee 11/06/2018    Rash 06/12/2019    Tumor lysis syndrome 07/28/2013    Noninfectious gastroenteritis 12/22/2009    Diverticular disease of colon 12/22/2009     Resolved Ambulatory Problems     Diagnosis Date Noted    No Resolved Ambulatory Problems     Past Medical History:   Diagnosis Date    ASCVD (arteriosclerotic cardiovascular disease)     Chronic osteoarthritis     DJD (degenerative joint disease), lumbar     Hyperlipidemia     Malnutrition (H24)      No Known Allergies    All Meds and Allergies reviewed in the record at the facility and is the most up-to-date.    Current Outpatient Medications   Medication Sig Dispense Refill    aspirin 81 MG EC tablet Take 81 mg by mouth daily.      calcium carbonate (OS-ESTELA) 500 MG tablet Take 1 tablet by mouth daily.      cyanocobalamin (VITAMIN B-12) 500 MCG tablet Take 500 mcg by mouth daily.      dexAMETHasone (DECADRON) 4 MG tablet Take 20 mg by mouth once a week. On Saturdays      droNABinol (MARINOL) 2.5 MG capsule TAKE 1 CAPSULE BY MOUTH TWICE DAILY 60 capsule 0    ferrous sulfate 325 (65 FE) MG tablet Take 325 mg by mouth daily (with breakfast).      HYDROmorphone (DILAUDID) 2 MG tablet Take 1 tablet (2 mg) by mouth every 4 hours as needed for severe pain. 30 tablet 0    LENalidomide (REVLIMID) 10 MG CAPS capsule Take 10 mg by mouth daily For 21 days, then 1 week off      metoprolol succinate ER (TOPROL XL) 50 MG 24 hr tablet Take 50 mg by mouth daily.      multivitamin, therapeutic (THERA-VIT) TABS tablet Take 1  "tablet by mouth daily.      pantoprazole (PROTONIX) 40 MG EC tablet Take 1 tablet (40 mg) by mouth every morning (before breakfast).      pregabalin (LYRICA) 50 MG capsule TAKE 1 CAPSULE BY MOUTH TWICE DAILY 62 capsule 0    senna-docusate (SENOKOT-S/PERICOLACE) 8.6-50 MG tablet Take 1 tablet by mouth 2 times daily as needed for constipation.      simvastatin (ZOCOR) 10 MG tablet Take 10 mg by mouth at bedtime.       No current facility-administered medications for this visit.       REVIEW OF SYSTEMS:   10 point review of systems reviewed and pertinent positives in the HPI.     PHYSICAL EXAMINATION:  Physical Exam     Vital signs: BP 94/55   Pulse 84   Temp 98.5  F (36.9  C)   Resp 16   Ht 1.575 m (5' 2\")   Wt 51.3 kg (113 lb)   SpO2 98%   BMI 20.67 kg/m    General: Awake, Alert, oriented x2 sitting up in wheelchair,  follows simple commands at times.  HEENT: Pink conjunctiva, dry oral mucosa  NECK: Supple  CVS:  S1  S2, without murmur or gallop.   LUNG: Clear to auscultation, No wheezes, rales or rhonci.  BACK: TLSO splinting on.    ABDOMEN: Soft, thin, non tender to palpation, with positive bowel sounds  EXTREMITIES: Moves both upper and lower extremities with diffuse weakness, no pedal edema, no calf tenderness  SKIN: Warm and dry  NEUROLOGIC: Intact, pulses palpable  PSYCHIATRIC: Cognitive impairment noted. Flat affect. BIMS 3/15.     Labs:  All labs reviewed in the nursing home record and [facility]   @  Lab Results   Component Value Date    WBC 3.3 09/03/2024    WBC 5.8 06/13/2013     Lab Results   Component Value Date    RBC 2.90 09/03/2024    RBC 3.33 06/13/2013     Lab Results   Component Value Date    HGB 8.9 09/03/2024    HGB 10.2 06/13/2013     Lab Results   Component Value Date    HCT 28.8 09/03/2024    HCT 32.0 06/13/2013     Lab Results   Component Value Date    MCV 99 09/03/2024    MCV 96 06/13/2013     Lab Results   Component Value Date    MCH 30.7 09/03/2024    MCH 30.6 06/13/2013     Lab Results "   Component Value Date    MCHC 30.9 09/03/2024    MCHC 31.9 06/13/2013     Lab Results   Component Value Date    RDW 16.1 09/03/2024    RDW 12.6 06/13/2013     Lab Results   Component Value Date     09/03/2024     06/13/2013        @Last Comprehensive Metabolic Panel:  Sodium   Date Value Ref Range Status   09/10/2024 142 135 - 145 mmol/L Final   06/13/2013 140 133 - 144 mmol/L Final     Potassium   Date Value Ref Range Status   09/10/2024 4.0 3.4 - 5.3 mmol/L Final   06/13/2013 4.2 3.4 - 5.3 mmol/L Final     Chloride   Date Value Ref Range Status   09/10/2024 108 (H) 98 - 107 mmol/L Final   06/13/2013 99 94 - 109 mmol/L Final     Carbon Dioxide   Date Value Ref Range Status   06/13/2013 30 20 - 32 mmol/L Final     Carbon Dioxide (CO2)   Date Value Ref Range Status   09/10/2024 25 22 - 29 mmol/L Final     Anion Gap   Date Value Ref Range Status   09/10/2024 9 7 - 15 mmol/L Final   06/13/2013 12 6 - 17 mmol/L Final     Glucose   Date Value Ref Range Status   09/10/2024 85 70 - 99 mg/dL Final   06/13/2013 113 (H) 60 - 99 mg/dL Final     GLUCOSE BY METER POCT   Date Value Ref Range Status   08/29/2024 79 70 - 99 mg/dL Final     Urea Nitrogen   Date Value Ref Range Status   09/10/2024 28.9 (H) 8.0 - 23.0 mg/dL Final   06/13/2013 36 (H) 7 - 30 mg/dL Final     Creatinine   Date Value Ref Range Status   09/10/2024 1.03 (H) 0.51 - 0.95 mg/dL Final   06/13/2013 1.02 0.52 - 1.04 mg/dL Final     GFR Estimate   Date Value Ref Range Status   09/10/2024 53 (L) >60 mL/min/1.73m2 Final   06/13/2013 53 (L) >60 mL/min/1.7m2 Final     Calcium   Date Value Ref Range Status   09/10/2024 9.0 8.8 - 10.4 mg/dL Final     Comment:     Reference intervals for this test were updated on 7/16/2024 to reflect our healthy population more accurately. There may be differences in the flagging of prior results with similar values performed with this method. Those prior results can be interpreted in the context of the updated reference  intervals.   06/13/2013 10.1 8.5 - 10.4 mg/dL Final       This note has been dictated using voice recognition software. Any grammatical or context distortions are unintentional and inherent to the software    Electronically signed by: Margaux Juarez CNP

## 2024-09-24 NOTE — LETTER
9/24/2024      Jacquelin Sampson  [id number]  [facility] [id number]        No notes on file      Sincerely,        NIRMAL Reo CNP

## 2024-09-24 NOTE — PROGRESS NOTES
[facility] GERIATRICS  INITIAL VISIT NOTE  September 24, 2024      PRIMARY CARE PROVIDER AND CLINIC:  dAilene Yoo [address] [facility] [address]    [facility] Medical Record Number:  [id number]  Place of Service where encounter took place:  [facility] () [[id number]]    Chief Complaint   Patient presents with    [facility] Care       HPI:    Jacquelin Sampson is a 84 year old  (1940) female was admitted to the above facility from  [facility] TCU. Recent Hospital stay at [facility] 8/25/24 through 8/29/24 where they were admitted for ***  Now admitted to this facility for  {FGS ADMISSION REASONS:405948}.      History obtained from: {FGS HPI:574330}.      Brief Hospital Course: ***    TCU Course: ***    CODE STATUS/ADVANCE DIRECTIVES: DNR / DNI    ALLERGIES:  No Known Allergies    PAST MEDICAL HISTORY:   Past Medical History:   Diagnosis Date    Anemia     ASCVD (arteriosclerotic cardiovascular disease)     Chronic osteoarthritis     DJD (degenerative joint disease), lumbar     Hyperlipidemia     Malnutrition (H24)     Multiple myeloma (H)        PAST SURGICAL HISTORY:   No past surgical history on file.    FAMILY HISTORY:   No family history on file.  Unable to review due to cognitive impairment***    SOCIAL HISTORY:   Patient's living condition: lives in a skilled nursing facility    MEDICATIONS  Post Discharge Medication Reconciliation Status: {ACO Med Rec (Provider):008153}.  Current Outpatient Medications   Medication Sig Dispense Refill    aspirin 81 MG EC tablet Take 81 mg by mouth daily.      calcium carbonate (OS-ESTELA) 500 MG tablet Take 1 tablet by mouth daily.      cyanocobalamin (VITAMIN B-12) 500 MCG tablet Take 500 mcg by mouth daily.      dexAMETHasone (DECADRON) 4 MG tablet Take 20 mg by mouth once a week. On Saturdays      droNABinol (MARINOL) 2.5 MG capsule TAKE 1 CAPSULE BY MOUTH TWICE DAILY 60 capsule 0     ferrous sulfate 325 (65 FE) MG tablet Take 325 mg by mouth daily (with breakfast).      HYDROmorphone (DILAUDID) 2 MG tablet Take 1 tablet (2 mg) by mouth every 4 hours as needed for severe pain. 30 tablet 0    LENalidomide (REVLIMID) 10 MG CAPS capsule Take 10 mg by mouth daily For 21 days, then 1 week off      LORazepam (ATIVAN) 0.5 MG tablet TAKE 1 TABLET BY MOUTH EVERY 6 HOURS AS NEEDED 60 tablet 1    metoprolol succinate ER (TOPROL XL) 50 MG 24 hr tablet Take 50 mg by mouth daily.      multivitamin, therapeutic (THERA-VIT) TABS tablet Take 1 tablet by mouth daily.      pantoprazole (PROTONIX) 40 MG EC tablet Take 1 tablet (40 mg) by mouth every morning (before breakfast).      pregabalin (LYRICA) 50 MG capsule TAKE 1 CAPSULE BY MOUTH TWICE DAILY 62 capsule 0    senna-docusate (SENOKOT-S/PERICOLACE) 8.6-50 MG tablet Take 1 tablet by mouth 2 times daily as needed for constipation.      simvastatin (ZOCOR) 10 MG tablet Take 10 mg by mouth at bedtime.         ROS:  10 point ROS neg other than the symptoms noted above in the HPI.***  Unable to obtain due to cognitive impairment or aphasia  ROS    PHYSICAL EXAM:  Wt 51.5 kg (113 lb 9.6 oz)   BMI 20.78 kg/m    Physical Exam     LABORATORY/IMAGING DATA:  [facility]    ASSESSMENT/PLAN:  {FGS DX INITIAL:827490}    Orders:   ***    {FGS TIME SPENT:336378}    Electronically signed by:  Fabi Lora              tablet by mouth 2 times daily as needed for constipation.      simvastatin (ZOCOR) 10 MG tablet Take 10 mg by mouth at bedtime.         ROS:  Denied chest pain, no SOB, no stomach discomfort.      PHYSICAL EXAM:  Wt 51.5 kg (113 lb 9.6 oz)   BMI 20.78 kg/m    Petite female in her wheelchair pulling and pushing aimlessly with a dining room table in one spot while talking with her.   Skin is pink, warm and dry.  Lifts her head when name called but does not hold conversation well.  Heart rate regular/irregular and strong.    Legs puffy but not pitting and coloring shows venous changes.    A1 with transfers for safety but she will get up on her own.  Staff will walk with her for safety as well.  Even with the wheelchair moving she tries to stand.       LABORATORY/IMAGING DATA:  Reviewed as [facility]    Component      Latest Ref Rng 9/10/2024  6:24 AM   WBC      4.0 - 11.0 10e3/uL 3.9 (L)    RBC Count      3.80 - 5.20 10e6/uL 2.79 (L)    Hemoglobin      11.7 - 15.7 g/dL 8.4 (L)    Hematocrit      35.0 - 47.0 % 27.1 (L)    MCV      78 - 100 fL 97    MCH      26.5 - 33.0 pg 30.1    MCHC      31.5 - 36.5 g/dL 31.0 (L)    RDW      10.0 - 15.0 % 15.5 (H)    Platelet Count      150 - 450 10e3/uL 194         ASSESSMENT/PLAN:  (M47.896) Other osteoarthritis of spine, lumbar region  (primary encounter diagnosis)  (Z87.81) Hx of compression fracture of spine  (Z74.09,  Z78.9) Impaired mobility and ADLs  Comment: now is in LTC as she is busy and most likely too much for spouse to handle.  Wheelchair for most of mobility.  She does not have the judgement to call for help to transfer and will attempt on own.    Remains on dronabinol 2.5mg BID, Calcium Carbonate, dexamethasone which could be for her myeloma as well.  Do not want to discontinue medications today as hospice usually does this, but not familiar with [facility] and their protocols.  Feel everyone is getting to know Jacquelin at this  point.    (C90.00) Multiple myeloma not having achieved remission (H)  (Z51.5) Hospice care patient  Comment: expected to have 6 months or less to live as decline seen in TCU and not safe to return home.  Often falls and would be difficult for spouse to help her up.  Comfort medications started like PRN Haldol, Morphine solutab.  Continue to monitor and treat according to symptoms.    (G62.0) Drug-induced polyneuropathy (H)  Comment: remains on Lyrica 50mg po BID.  Does have PRN dilaudid as well which is an order since original admission.      (I10) Essential hypertension  Comment: blood pressures range from 110-120's/50-60's.  Remains on Metoprolol Succinate 50mg po daily.  No changes.    (Q39.4) Esophageal web determined by endoscopy  Comment: remains on Protonix 40mg po every AM.       Orders:   No new orders.  Will work with hospice about trimming the medication list with time.    Total time spent with patient visit at the skilled nursing facility was 25 min including patient visit, review of past records, and discussion with nursing. Greater than 50% of total time spent with counseling and coordinating care due to gathering information, discussion with nursing,  visit with Jacquelin.    Electronically signed by:  NIRMAL Roe CNP

## 2024-09-24 NOTE — LETTER
9/24/2024      Jacquelin Sampson  [address]  [place] [address]        [facility] GERIATRICS  INITIAL VISIT NOTE  September 24, 2024      PRIMARY CARE PROVIDER AND CLINIC:  Adilene Yoo [address] / [place] [address]    [facility] Medical Record Number:  [id number]  Place of Service where encounter took place:  [facility] () [[id number]]    Chief Complaint   Patient presents with     Establish Care       HPI:    Jacquelin Sampson is a 84 year old  (1940) female was admitted to the above facility from  [facility] TCU. Recent Hospital stay at [facility] 8/25/24 through 8/29/24 where they were admitted for Compression fracture of lumbar spine.  Was in the TCU next door for therapies and decision was made to move her over here to LTC for 24/7 care.  The move occurred on 9/19/24.     History obtained from: facility chart records, facility staff, patient report, and [facility] chart review.      Jacquelin is being seen today as she is sitting up in her wheelchair in the empty dining room near her room.  She is using her hands to grab onto objects and pull self along.  Petite female in an appropriate sized wheelchair.  In no acute distress.  States her back bothers her a lot.  Got a lift chair now to be able to use for another surface to sit in that is comfortable.      In reviewing her [employer] chart, do see she continued to transfer unassisted and have frequent falls.  Spouse changed her code status to DNR/DNI.  Now is also admitted into the [facility] program.  Will be here long term until her death.    Jacquelin had no requests today.    CODE STATUS/ADVANCE DIRECTIVES: DNR / DNI    ALLERGIES:  No Known Allergies    PAST MEDICAL HISTORY:   Past Medical History:   Diagnosis Date     Anemia      ASCVD (arteriosclerotic cardiovascular disease)      Chronic osteoarthritis      DJD (degenerative joint disease), lumbar       Hyperlipidemia      Malnutrition (H24)      Multiple myeloma (H)        PAST SURGICAL HISTORY:   Past Surgical History:   Procedure Laterality Date     APPENDECTOMY       HYSTERECTOMY N/A      LAMINECTOMY N/A      MASTECTOMY Bilateral      OOPHORECTOMY N/A      REPLACEMENT TOTAL KNEE Left        FAMILY HISTORY:   No family history on file.  Unable to review due to cognitive impairment    SOCIAL HISTORY:   Patient's living condition: lives in a skilled nursing facility    MEDICATIONS  Post Discharge Medication Reconciliation Status: discharge medications reconciled and changed, per note/orders.  Current Outpatient Medications   Medication Sig Dispense Refill     aspirin 81 MG EC tablet Take 81 mg by mouth daily.       calcium carbonate (OS-ESTELA) 500 MG tablet Take 1 tablet by mouth daily.       cyanocobalamin (VITAMIN B-12) 500 MCG tablet Take 500 mcg by mouth daily.       dexAMETHasone (DECADRON) 4 MG tablet Take 20 mg by mouth once a week. On Saturdays       droNABinol (MARINOL) 2.5 MG capsule TAKE 1 CAPSULE BY MOUTH TWICE DAILY 60 capsule 0     ferrous sulfate 325 (65 FE) MG tablet Take 325 mg by mouth daily (with breakfast).      Haldol 0.5mg 0.5mg by mouth every 4 hours as needed for agitation, hallucinations.         HYDROmorphone (DILAUDID) 2 MG tablet Take 1 tablet (2 mg) by mouth every 4 hours as needed for severe pain. 30 tablet 0     LENalidomide (REVLIMID) 10 MG CAPS capsule Take 10 mg by mouth daily For 21 days, then 1 week off       LORazepam (ATIVAN) 0.5 MG tablet TAKE 1 TABLET BY MOUTH EVERY 6 HOURS AS NEEDED 60 tablet 1     metoprolol succinate ER (TOPROL XL) 50 MG 24 hr tablet Take 50 mg by mouth daily.      Morphine solutab 5mg  5mg SL every 1 hours PRN       multivitamin, therapeutic (THERA-VIT) TABS tablet Take 1 tablet by mouth daily.       pantoprazole (PROTONIX) 40 MG EC tablet Take 1 tablet (40 mg) by mouth every morning (before breakfast).       pregabalin (LYRICA) 50 MG capsule TAKE 1  CAPSULE BY MOUTH TWICE DAILY 62 capsule 0     senna-docusate (SENOKOT-S/PERICOLACE) 8.6-50 MG tablet Take 1 tablet by mouth 2 times daily as needed for constipation.       simvastatin (ZOCOR) 10 MG tablet Take 10 mg by mouth at bedtime.         ROS:  Denied chest pain, no SOB, no stomach discomfort.      PHYSICAL EXAM:  Wt 51.5 kg (113 lb 9.6 oz)   BMI 20.78 kg/m    Petite female in her wheelchair pulling and pushing aimlessly with a dining room table in one spot while talking with her.   Skin is pink, warm and dry.  Lifts her head when name called but does not hold conversation well.  Heart rate regular/irregular and strong.    Legs puffy but not pitting and coloring shows venous changes.    A1 with transfers for safety but she will get up on her own.  Staff will walk with her for safety as well.  Even with the wheelchair moving she tries to stand.       LABORATORY/IMAGING DATA:  Reviewed as per [facility] and/or [facility]    Component      Latest Ref Rng 9/10/2024  6:24 AM   WBC      4.0 - 11.0 10e3/uL 3.9 (L)    RBC Count      3.80 - 5.20 10e6/uL 2.79 (L)    Hemoglobin      11.7 - 15.7 g/dL 8.4 (L)    Hematocrit      35.0 - 47.0 % 27.1 (L)    MCV      78 - 100 fL 97    MCH      26.5 - 33.0 pg 30.1    MCHC      31.5 - 36.5 g/dL 31.0 (L)    RDW      10.0 - 15.0 % 15.5 (H)    Platelet Count      150 - 450 10e3/uL 194         ASSESSMENT/PLAN:  (M47.896) Other osteoarthritis of spine, lumbar region  (primary encounter diagnosis)  (Z87.81) Hx of compression fracture of spine  (Z74.09,  Z78.9) Impaired mobility and ADLs  Comment: now is in LTC as she is busy and most likely too much for spouse to handle.  Wheelchair for most of mobility.  She does not have the judgement to call for help to transfer and will attempt on own.    Remains on dronabinol 2.5mg BID, Calcium Carbonate, dexamethasone which could be for her myeloma as well.  Do not want to discontinue medications today as hospice usually does this, but not  familiar with [facility] and their protocols.  Feel everyone is getting to know Jacquelin at this point.    (C90.00) Multiple myeloma not having achieved remission (H)  (Z51.5) Hospice care patient  Comment: expected to have 6 months or less to live as decline seen in TCU and not safe to return home.  Often falls and would be difficult for spouse to help her up.  Comfort medications started like PRN Haldol, Morphine solutab.  Continue to monitor and treat according to symptoms.    (G62.0) Drug-induced polyneuropathy (H)  Comment: remains on Lyrica 50mg po BID.  Does have PRN dilaudid as well which is an order since original admission.      (I10) Essential hypertension  Comment: blood pressures range from 110-120's/50-60's.  Remains on Metoprolol Succinate 50mg po daily.  No changes.    (Q39.4) Esophageal web determined by endoscopy  Comment: remains on Protonix 40mg po every AM.       Orders:   No new orders.  Will work with hospice about trimming the medication list with time.    Total time spent with patient visit at the skilled nursing facility was 25 min including patient visit, review of past records, and discussion with nursing. Greater than 50% of total time spent with counseling and coordinating care due to gathering information, discussion with nursing,  visit with Jacquelin.    Electronically signed by:  NIRMAL Roe CNP                 Sincerely,        NIRMAL Roe CNP

## 2024-10-02 PROBLEM — Z51.5 HOSPICE CARE PATIENT: Status: ACTIVE | Noted: 2024-01-01

## 2024-10-02 NOTE — PROGRESS NOTES
[facility] GERIATRICS  ACUTE/EPISODIC VISIT    [facility] Medical Record Number:  [id number]  Place of Service where encounter took place:  [facility] () [[id number]]    Chief Complaint   Patient presents with    RECHECK       HPI:    Jacquelin Sampson is a 84 year old  (1940), who is being seen today for an episodic care visit.  HPI information obtained from: facility chart records, facility staff, patient report, and family/first contact hospice nurse report.    Today's concern is:    Diagnoses         Codes Comments    Acute midline low back pain without sciatica    -  Primary M54.50     Closed compression fracture of L2 lumbar vertebra, sequela     S32.020S     Age-related osteoporosis without current pathological fracture     M81.0     Hospice care patient     Z51.5     Drug-induced constipation     K59.03           Nursing brought up to this NP that Jacquelin is experiencing a lot more pain.  When asking where, they said in her back.  In the same breath, they stated she is on hospice and they should go through the hospice team.    This NP can certainly look at her medication and make a recommendation.  Did ultimately called the hospice nurse and spoke on the phone asking if she was ok with this NP making a medication change and what pharmacy would the medication from.  Hospice was fine with the Dilaudid change and will order the medication.    ALLERGIES:  No Known Allergies     MEDICATIONS:  Post Discharge Medication Reconciliation Status: patient was not discharged from an inpatient facility or TCU.     Current Outpatient Medications   Medication Sig Dispense Refill    HYDROmorphone (DILAUDID) 2 MG tablet Take 1 tablet (2 mg) by mouth every 4 hours.      aspirin 81 MG EC tablet Take 81 mg by mouth daily.      calcium carbonate (OS-ESTELA) 500 MG tablet Take 1 tablet by mouth daily.      cyanocobalamin (VITAMIN B-12) 500 MCG tablet Take 500 mcg by mouth daily.      dexAMETHasone (DECADRON)  4 MG tablet Take 20 mg by mouth once a week. On Saturdays      droNABinol (MARINOL) 2.5 MG capsule TAKE 1 CAPSULE BY MOUTH TWICE DAILY 60 capsule 0    ferrous sulfate 325 (65 FE) MG tablet Take 325 mg by mouth daily (with breakfast).      LENalidomide (REVLIMID) 10 MG CAPS capsule Take 10 mg by mouth daily For 21 days, then 1 week off      LORazepam (ATIVAN) 0.5 MG tablet TAKE 1 TABLET BY MOUTH EVERY 6 HOURS AS NEEDED 60 tablet 1    metoprolol succinate ER (TOPROL XL) 50 MG 24 hr tablet Take 50 mg by mouth daily.      multivitamin, therapeutic (THERA-VIT) TABS tablet Take 1 tablet by mouth daily.      pantoprazole (PROTONIX) 40 MG EC tablet Take 1 tablet (40 mg) by mouth every morning (before breakfast).      pregabalin (LYRICA) 50 MG capsule TAKE 1 CAPSULE BY MOUTH TWICE DAILY 62 capsule 0    senna-docusate (SENOKOT-S/PERICOLACE) 8.6-50 MG tablet Take 1 tablet by mouth 2 times daily as needed for constipation.      simvastatin (ZOCOR) 10 MG tablet Take 10 mg by mouth at bedtime.         REVIEW OF SYSTEMS:  4 point ROS neg other than the symptoms noted above in the HPI.  Denied constipation, SOB, or heart issues.  States her lower back hurts her a lot.      PHYSICAL EXAM:  /73   Pulse 69   Temp 98.3  F (36.8  C)   Resp 18   Wt 51.5 kg (113 lb 9.6 oz)   SpO2 97%   BMI 20.78 kg/m    Petite female found sitting in her wheelchair in her room having coffee, muffin and fruit.  Swallowing without difficulty.  She does not stay focused on her food but moves her wheelchair around right in the space of the wheelchair.    Alert and oriented x2.  States she wants to get out of here.  No glasses.  Some eye contact.    Lungs are clear with following commands for deep breath.    Abdomen is flat. Soft and non-tender.    Lower legs have trace edema in pocketed area around ankles.        ASSESSMENT / PLAN:  (M54.50) Acute midline low back pain without sciatica  (primary encounter diagnosis)  (S32.020S) Closed compression  fracture of L2 lumbar vertebra, sequela  (Z51.5) Hospice care patient  (M81.0) Age-related osteoporosis without current pathological fracture  Comment: reviewed her medication list as she is on a few strong medications to help with her back.  Even though her affect is somewhat flat, do not believe she is drug seeking.  Staff even states she is complaining of her back.    Decided to increase the Hydromorphone to 2mg every 4 hours vs 6 hours scheduled.    There are morphine solutab available PRN.    Remains on pregabalin and dromabinol 2.5mg twice a day.   Does take medications for osteoporosis as well.  Plan: HYDROmorphone (DILAUDID) 2 MG tablet      (K59.03) Drug-induced constipation  Comment: did speak to her about constipation as she stated she knows the pain medications can cause slow bowels.  She assured this NP that was not the case right now but staff continue to monitor.  No scheduled bowel medications.  Has PRN Senna-S available.     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  Considered increase the Lyrica by putting a 25mg tab mid day but this is more for neuropathic pain and she was stating it is back pain.  Uses a wheelchair for mobility and uses feet and her arms to propel wheelchair.  Do see her hold furniture more to propel self.      Orders:  Increase the Hydromorphone 2mg to every 4 hours scheduled.  Hospice will send out supply.    Electronically signed by  NIRMAL Roe CNP

## 2024-10-02 NOTE — LETTER
10/2/2024      Jacquelin Sampson  [address]  [facility] [address]        [facility] GERIATRICS  ACUTE/EPISODIC VISIT    [facility] Medical Record Number:  [id number]  [facility] [place] [facility] [[id number]]    Chief Complaint   Patient presents with     RECHECK       HPI:    Jacquelin Sampson is a 84 year old  (1940), who is being seen today for an episodic care visit.  HPI information obtained from: facility chart records, facility staff, patient report, and family/first contact hospice nurse report.    Today's concern is:    Diagnoses         Codes Comments    Acute midline low back pain without sciatica    -  Primary M54.50     Closed compression fracture of L2 lumbar vertebra, sequela     S32.020S     Age-related osteoporosis without current pathological fracture     M81.0     Hospice care patient     Z51.5     Drug-induced constipation     K59.03           Nursing brought up to this NP that Jacquelin is experiencing a lot more pain.  When asking where, they said in her back.  In the same breath, they stated she is on hospice and they should go through the hospice team.    This NP can certainly look at her medication and make a recommendation.  Did ultimately called the hospice nurse and spoke on the phone asking if she was ok with this NP making a medication change and what pharmacy would the medication from.  Hospice was fine with the Dilaudid change and will order the medication.    ALLERGIES:  No Known Allergies     MEDICATIONS:  Post Discharge Medication Reconciliation Status: patient was not discharged from an inpatient facility or TCU.     Current Outpatient Medications   Medication Sig Dispense Refill     HYDROmorphone (DILAUDID) 2 MG tablet Take 1 tablet (2 mg) by mouth every 4 hours.       aspirin 81 MG EC tablet Take 81 mg by mouth daily.       calcium carbonate (OS-ESTELA) 500 MG tablet Take 1 tablet by mouth daily.        cyanocobalamin (VITAMIN B-12) 500 MCG tablet Take 500 mcg by mouth daily.       dexAMETHasone (DECADRON) 4 MG tablet Take 20 mg by mouth once a week. On Saturdays       droNABinol (MARINOL) 2.5 MG capsule TAKE 1 CAPSULE BY MOUTH TWICE DAILY 60 capsule 0     ferrous sulfate 325 (65 FE) MG tablet Take 325 mg by mouth daily (with breakfast).       LENalidomide (REVLIMID) 10 MG CAPS capsule Take 10 mg by mouth daily For 21 days, then 1 week off       LORazepam (ATIVAN) 0.5 MG tablet TAKE 1 TABLET BY MOUTH EVERY 6 HOURS AS NEEDED 60 tablet 1     metoprolol succinate ER (TOPROL XL) 50 MG 24 hr tablet Take 50 mg by mouth daily.       multivitamin, therapeutic (THERA-VIT) TABS tablet Take 1 tablet by mouth daily.       pantoprazole (PROTONIX) 40 MG EC tablet Take 1 tablet (40 mg) by mouth every morning (before breakfast).       pregabalin (LYRICA) 50 MG capsule TAKE 1 CAPSULE BY MOUTH TWICE DAILY 62 capsule 0     senna-docusate (SENOKOT-S/PERICOLACE) 8.6-50 MG tablet Take 1 tablet by mouth 2 times daily as needed for constipation.       simvastatin (ZOCOR) 10 MG tablet Take 10 mg by mouth at bedtime.         REVIEW OF SYSTEMS:  4 point ROS neg other than the symptoms noted above in the HPI.  Denied constipation, SOB, or heart issues.  States her lower back hurts her a lot.      PHYSICAL EXAM:  /73   Pulse 69   Temp 98.3  F (36.8  C)   Resp 18   Wt 51.5 kg (113 lb 9.6 oz)   SpO2 97%   BMI 20.78 kg/m    Petite female found sitting in her wheelchair in her room having coffee, muffin and fruit.  Swallowing without difficulty.  She does not stay focused on her food but moves her wheelchair around right in the space of the wheelchair.    Alert and oriented x2.  States she wants to get out of here.  No glasses.  Some eye contact.    Lungs are clear with following commands for deep breath.    Abdomen is flat. Soft and non-tender.    Lower legs have trace edema in pocketed area around ankles.        ASSESSMENT /  PLAN:  (M54.50) Acute midline low back pain without sciatica  (primary encounter diagnosis)  (S32.020S) Closed compression fracture of L2 lumbar vertebra, sequela  (Z51.5) Hospice care patient  (M81.0) Age-related osteoporosis without current pathological fracture  Comment: reviewed her medication list as she is on a few strong medications to help with her back.  Even though her affect is somewhat flat, do not believe she is drug seeking.  Staff even states she is complaining of her back.    Decided to increase the Hydromorphone to 2mg every 4 hours vs 6 hours scheduled.    There are morphine solutab available PRN.    Remains on pregabalin and dromabinol 2.5mg twice a day.   Does take medications for osteoporosis as well.  Plan: HYDROmorphone (DILAUDID) 2 MG tablet      (K59.03) Drug-induced constipation  Comment: did speak to her about constipation as she stated she knows the pain medications can cause slow bowels.  She assured this NP that was not the case right now but staff continue to monitor.  No scheduled bowel medications.  Has PRN Senna-S available.     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  Considered increase the Lyrica by putting a 25mg tab mid day but this is more for neuropathic pain and she was stating it is back pain.  Uses a wheelchair for mobility and uses feet and her arms to propel wheelchair.  Do see her hold furniture more to propel self.      Orders:  Increase the Hydromorphone 2mg to every 4 hours scheduled.  Hospice will send out supply.    Electronically signed by  NIRMAL Roe CNP            Sincerely,        NIRMAL Roe CNP

## 2024-10-04 NOTE — TELEPHONE ENCOUNTER
10,000-50,000 CFU/mL Escherichia coli Abnormal       10,000-50,000 CFU/mL Streptococcus anginosus Abnormal    This organism is susceptible to ampicillin, penicillin, vancomycin and the cephalosporins. If treatment is required and your patient is allergic to penicillin, contact the microbiology lab within 5 days to request susceptibility testing.        Resulting Agency: [facility]     Susceptibility     Escherichia coli     ALBERTO     Ampicillin <=2 ug/mL Susceptible     Ampicillin/ Sulbactam <=2 ug/mL Susceptible     Cefazolin <=4 ug/mL Susceptible 1     Cefepime <=1 ug/mL Susceptible     Cefoxitin <=4 ug/mL Susceptible     Ceftazidime <=1 ug/mL Susceptible     Ceftriaxone <=1 ug/mL Susceptible     Ciprofloxacin >=4 ug/mL Resistant     Gentamicin <=1 ug/mL Susceptible     Levofloxacin >=8 ug/mL Resistant     Nitrofurantoin <=16 ug/mL Susceptible     Piperacillin/Tazobactam <=4 ug/mL Susceptible     Tobramycin <=1 ug/mL Susceptible     Trimethoprim/Sulfamethoxazole <=1/19 ug/mL Susceptible              [facility] RESULTS RETURNED.  SENDING SCRIPT TO [facility] FOR KEFLEX X5 DAYS.    NEW ORDERS:    Keflex 500mg po TID for 5 days for UTI    NIRMAL Roe CNP